# Patient Record
Sex: MALE | HISPANIC OR LATINO | URBAN - METROPOLITAN AREA
[De-identification: names, ages, dates, MRNs, and addresses within clinical notes are randomized per-mention and may not be internally consistent; named-entity substitution may affect disease eponyms.]

---

## 2018-09-20 ENCOUNTER — EMERGENCY (EMERGENCY)
Facility: HOSPITAL | Age: 33
LOS: 0 days | Discharge: HOME | End: 2018-09-21
Attending: EMERGENCY MEDICINE | Admitting: EMERGENCY MEDICINE
Payer: SELF-PAY

## 2018-09-20 VITALS
OXYGEN SATURATION: 98 % | RESPIRATION RATE: 15 BRPM | DIASTOLIC BLOOD PRESSURE: 98 MMHG | TEMPERATURE: 98 F | HEART RATE: 102 BPM | SYSTOLIC BLOOD PRESSURE: 147 MMHG | HEIGHT: 62 IN | WEIGHT: 134.92 LBS

## 2018-09-20 DIAGNOSIS — G40.89 OTHER SEIZURES: ICD-10-CM

## 2018-09-20 DIAGNOSIS — E87.6 HYPOKALEMIA: ICD-10-CM

## 2018-09-20 DIAGNOSIS — D64.9 ANEMIA, UNSPECIFIED: ICD-10-CM

## 2018-09-20 PROCEDURE — 99053 MED SERV 10PM-8AM 24 HR FAC: CPT

## 2018-09-20 PROCEDURE — 99285 EMERGENCY DEPT VISIT HI MDM: CPT | Mod: 25

## 2018-09-20 RX ORDER — LEVETIRACETAM 250 MG/1
500 TABLET, FILM COATED ORAL ONCE
Qty: 0 | Refills: 0 | Status: COMPLETED | OUTPATIENT
Start: 2018-09-20 | End: 2018-09-20

## 2018-09-20 NOTE — ED PROVIDER NOTE - PROGRESS NOTE DETAILS
d/w pt results of tests via .  Pt with no previous h/o anemia.  denies melana.  Brown stool on exam guiac neg.  Pt to f/u at [facility] for both evaluation of the anemia and for the seizures.  Will send keppra for now.

## 2018-09-20 NOTE — ED PROVIDER NOTE - OBJECTIVE STATEMENT
int [id number]  30 yo male biba s/p seizure.  Patient reports he was at work and had a seizure while seated in a chair, did not fall to the ground.  Patient states he felt normal prior to the seizure.  Had one prior seizure about 1 year ago, not started on medication, but seen by a doctor at that time.  Does not have a PMD.  Denies smoking, drinking or drug use.  Patient is able to provide a limited hx, but is post ictal.

## 2018-09-20 NOTE — ED ADULT NURSE NOTE - OBJECTIVE STATEMENT
Pt brought to the ED s/p seizure at work. Pt states that he was sitting in a chair when the seizure occurred. Pt states that he had a previous seizure approx 1 year ago, but denies ever being on medication.

## 2018-09-20 NOTE — ED PROVIDER NOTE - GASTROINTESTINAL, MLM
Abdomen soft, non-tender, no guarding. Abdomen soft, non-tender, no guarding.  Brown stool guiac neg control pos [id number] Exp 3/31/19

## 2018-09-21 VITALS
SYSTOLIC BLOOD PRESSURE: 136 MMHG | OXYGEN SATURATION: 99 % | HEART RATE: 100 BPM | DIASTOLIC BLOOD PRESSURE: 91 MMHG | RESPIRATION RATE: 16 BRPM

## 2018-09-21 LAB
ALBUMIN SERPL ELPH-MCNC: 3.8 G/DL — SIGNIFICANT CHANGE UP (ref 3.3–5)
ALP SERPL-CCNC: 113 U/L — SIGNIFICANT CHANGE UP (ref 40–120)
ALT FLD-CCNC: 51 U/L — SIGNIFICANT CHANGE UP (ref 12–78)
ANION GAP SERPL CALC-SCNC: 12 MMOL/L — SIGNIFICANT CHANGE UP (ref 5–17)
ANISOCYTOSIS BLD QL: SLIGHT — SIGNIFICANT CHANGE UP
AST SERPL-CCNC: 70 U/L — HIGH (ref 15–37)
BASOPHILS # BLD AUTO: 0 K/UL — SIGNIFICANT CHANGE UP (ref 0–0.2)
BASOPHILS NFR BLD AUTO: 0 % — SIGNIFICANT CHANGE UP (ref 0–2)
BILIRUB SERPL-MCNC: 0.8 MG/DL — SIGNIFICANT CHANGE UP (ref 0.2–1.2)
BUN SERPL-MCNC: 5 MG/DL — LOW (ref 7–23)
CALCIUM SERPL-MCNC: 8.7 MG/DL — SIGNIFICANT CHANGE UP (ref 8.5–10.1)
CHLORIDE SERPL-SCNC: 98 MMOL/L — SIGNIFICANT CHANGE UP (ref 96–108)
CO2 SERPL-SCNC: 27 MMOL/L — SIGNIFICANT CHANGE UP (ref 22–31)
CREAT SERPL-MCNC: 0.7 MG/DL — SIGNIFICANT CHANGE UP (ref 0.5–1.3)
DACRYOCYTES BLD QL SMEAR: SLIGHT — SIGNIFICANT CHANGE UP
EOSINOPHIL # BLD AUTO: 0 K/UL — SIGNIFICANT CHANGE UP (ref 0–0.5)
EOSINOPHIL NFR BLD AUTO: 0 % — SIGNIFICANT CHANGE UP (ref 0–6)
ETHANOL SERPL-MCNC: <10 MG/DL — SIGNIFICANT CHANGE UP (ref 0–10)
GLUCOSE SERPL-MCNC: 140 MG/DL — HIGH (ref 70–99)
HCT VFR BLD CALC: 29.1 % — LOW (ref 39–50)
HGB BLD-MCNC: 8.7 G/DL — LOW (ref 13–17)
HYPOCHROMIA BLD QL: SIGNIFICANT CHANGE UP
LYMPHOCYTES # BLD AUTO: 0.56 K/UL — LOW (ref 1–3.3)
LYMPHOCYTES # BLD AUTO: 5 % — LOW (ref 13–44)
MAGNESIUM SERPL-MCNC: 1.8 MG/DL — SIGNIFICANT CHANGE UP (ref 1.6–2.6)
MANUAL SMEAR VERIFICATION: SIGNIFICANT CHANGE UP
MCHC RBC-ENTMCNC: 21.8 PG — LOW (ref 27–34)
MCHC RBC-ENTMCNC: 29.9 GM/DL — LOW (ref 32–36)
MCV RBC AUTO: 72.9 FL — LOW (ref 80–100)
MICROCYTES BLD QL: SIGNIFICANT CHANGE UP
MONOCYTES # BLD AUTO: 0.67 K/UL — SIGNIFICANT CHANGE UP (ref 0–0.9)
MONOCYTES NFR BLD AUTO: 6 % — SIGNIFICANT CHANGE UP (ref 2–14)
NEUTROPHILS # BLD AUTO: 9.85 K/UL — HIGH (ref 1.8–7.4)
NEUTROPHILS NFR BLD AUTO: 88 % — HIGH (ref 43–77)
NRBC # BLD: 0 /100 — SIGNIFICANT CHANGE UP (ref 0–0)
NRBC # BLD: SIGNIFICANT CHANGE UP /100 WBCS (ref 0–0)
OVALOCYTES BLD QL SMEAR: SLIGHT — SIGNIFICANT CHANGE UP
PLAT MORPH BLD: NORMAL — SIGNIFICANT CHANGE UP
PLATELET # BLD AUTO: 244 K/UL — SIGNIFICANT CHANGE UP (ref 150–400)
POIKILOCYTOSIS BLD QL AUTO: SLIGHT — SIGNIFICANT CHANGE UP
POTASSIUM SERPL-MCNC: 3 MMOL/L — LOW (ref 3.5–5.3)
POTASSIUM SERPL-SCNC: 3 MMOL/L — LOW (ref 3.5–5.3)
PROMYELOCYTES # FLD: 1 % — HIGH (ref 0–0)
PROT SERPL-MCNC: 9.1 GM/DL — HIGH (ref 6–8.3)
RBC # BLD: 3.99 M/UL — LOW (ref 4.2–5.8)
RBC # FLD: 18.7 % — HIGH (ref 10.3–14.5)
RBC BLD AUTO: ABNORMAL
SODIUM SERPL-SCNC: 137 MMOL/L — SIGNIFICANT CHANGE UP (ref 135–145)
STOMATOCYTES BLD QL SMEAR: SIGNIFICANT CHANGE UP
TARGETS BLD QL SMEAR: SLIGHT — SIGNIFICANT CHANGE UP
WBC # BLD: 11.19 K/UL — HIGH (ref 3.8–10.5)
WBC # FLD AUTO: 11.19 K/UL — HIGH (ref 3.8–10.5)

## 2018-09-21 PROCEDURE — 70450 CT HEAD/BRAIN W/O DYE: CPT | Mod: 26

## 2018-09-21 RX ORDER — POTASSIUM CHLORIDE 20 MEQ
40 PACKET (EA) ORAL ONCE
Qty: 0 | Refills: 0 | Status: COMPLETED | OUTPATIENT
Start: 2018-09-21 | End: 2018-09-21

## 2018-09-21 RX ORDER — POTASSIUM CHLORIDE 20 MEQ
40 PACKET (EA) ORAL ONCE
Qty: 0 | Refills: 0 | Status: DISCONTINUED | OUTPATIENT
Start: 2018-09-21 | End: 2018-09-21

## 2018-09-21 RX ORDER — LEVETIRACETAM 250 MG/1
1 TABLET, FILM COATED ORAL
Qty: 60 | Refills: 0
Start: 2018-09-21

## 2018-09-21 RX ADMIN — Medication 40 MILLIEQUIVALENT(S): at 03:14

## 2018-09-21 RX ADMIN — LEVETIRACETAM 400 MILLIGRAM(S): 250 TABLET, FILM COATED ORAL at 00:10

## 2021-09-14 ENCOUNTER — INPATIENT (INPATIENT)
Facility: HOSPITAL | Age: 36
LOS: 5 days | Discharge: HOME | End: 2021-09-20
Attending: INTERNAL MEDICINE | Admitting: INTERNAL MEDICINE
Payer: MEDICAID

## 2021-09-14 VITALS
HEART RATE: 128 BPM | RESPIRATION RATE: 18 BRPM | HEIGHT: 62 IN | TEMPERATURE: 98 F | SYSTOLIC BLOOD PRESSURE: 115 MMHG | WEIGHT: 139.99 LBS | OXYGEN SATURATION: 98 % | DIASTOLIC BLOOD PRESSURE: 83 MMHG

## 2021-09-14 DIAGNOSIS — F10.239 ALCOHOL DEPENDENCE WITH WITHDRAWAL, UNSPECIFIED: ICD-10-CM

## 2021-09-14 PROBLEM — G40.909 EPILEPSY, UNSPECIFIED, NOT INTRACTABLE, WITHOUT STATUS EPILEPTICUS: Chronic | Status: ACTIVE | Noted: 2018-09-20

## 2021-09-14 LAB
ADD ON TEST-SPECIMEN IN LAB: SIGNIFICANT CHANGE UP
ADD ON TEST-SPECIMEN IN LAB: SIGNIFICANT CHANGE UP
ALBUMIN SERPL ELPH-MCNC: 4.6 G/DL — SIGNIFICANT CHANGE UP (ref 3.3–5)
ALP SERPL-CCNC: 139 U/L — HIGH (ref 40–120)
ALT FLD-CCNC: 84 U/L — HIGH (ref 12–78)
AMMONIA BLD-MCNC: 83 UMOL/L — HIGH (ref 11–32)
ANION GAP SERPL CALC-SCNC: 10 MMOL/L — SIGNIFICANT CHANGE UP (ref 5–17)
APAP SERPL-MCNC: < 2 UG/ML (ref 10–30)
APPEARANCE UR: CLEAR — SIGNIFICANT CHANGE UP
APTT BLD: 32.6 SEC — SIGNIFICANT CHANGE UP (ref 27.5–35.5)
AST SERPL-CCNC: 206 U/L — HIGH (ref 15–37)
BASOPHILS # BLD AUTO: 0.11 K/UL — SIGNIFICANT CHANGE UP (ref 0–0.2)
BASOPHILS NFR BLD AUTO: 0.8 % — SIGNIFICANT CHANGE UP (ref 0–2)
BILIRUB SERPL-MCNC: 2 MG/DL — HIGH (ref 0.2–1.2)
BILIRUB UR-MCNC: ABNORMAL
BUN SERPL-MCNC: 21 MG/DL — SIGNIFICANT CHANGE UP (ref 7–23)
CALCIUM SERPL-MCNC: 9.7 MG/DL — SIGNIFICANT CHANGE UP (ref 8.5–10.1)
CHLORIDE SERPL-SCNC: 100 MMOL/L — SIGNIFICANT CHANGE UP (ref 96–108)
CO2 SERPL-SCNC: 25 MMOL/L — SIGNIFICANT CHANGE UP (ref 22–31)
COLOR SPEC: ABNORMAL
CREAT SERPL-MCNC: 1.31 MG/DL — HIGH (ref 0.5–1.3)
DIFF PNL FLD: ABNORMAL
EOSINOPHIL # BLD AUTO: 0 K/UL — SIGNIFICANT CHANGE UP (ref 0–0.5)
EOSINOPHIL NFR BLD AUTO: 0 % — SIGNIFICANT CHANGE UP (ref 0–6)
ETHANOL SERPL-MCNC: <10 MG/DL — SIGNIFICANT CHANGE UP (ref 0–10)
GLUCOSE SERPL-MCNC: 135 MG/DL — HIGH (ref 70–99)
GLUCOSE UR QL: NEGATIVE MG/DL — SIGNIFICANT CHANGE UP
HCT VFR BLD CALC: 37.6 % — LOW (ref 39–50)
HGB BLD-MCNC: 11.7 G/DL — LOW (ref 13–17)
IMM GRANULOCYTES NFR BLD AUTO: 0.6 % — SIGNIFICANT CHANGE UP (ref 0–1.5)
INR BLD: 1.2 RATIO — HIGH (ref 0.88–1.16)
KETONES UR-MCNC: ABNORMAL
LACTATE SERPL-SCNC: 0.8 MMOL/L — SIGNIFICANT CHANGE UP (ref 0.7–2)
LEUKOCYTE ESTERASE UR-ACNC: ABNORMAL
LYMPHOCYTES # BLD AUTO: 0.71 K/UL — LOW (ref 1–3.3)
LYMPHOCYTES # BLD AUTO: 5 % — LOW (ref 13–44)
MCHC RBC-ENTMCNC: 24 PG — LOW (ref 27–34)
MCHC RBC-ENTMCNC: 31.1 GM/DL — LOW (ref 32–36)
MCV RBC AUTO: 77.2 FL — LOW (ref 80–100)
MONOCYTES # BLD AUTO: 0.94 K/UL — HIGH (ref 0–0.9)
MONOCYTES NFR BLD AUTO: 6.6 % — SIGNIFICANT CHANGE UP (ref 2–14)
NEUTROPHILS # BLD AUTO: 12.44 K/UL — HIGH (ref 1.8–7.4)
NEUTROPHILS NFR BLD AUTO: 87 % — HIGH (ref 43–77)
NITRITE UR-MCNC: NEGATIVE — SIGNIFICANT CHANGE UP
PCP SPEC-MCNC: SIGNIFICANT CHANGE UP
PH UR: 6 — SIGNIFICANT CHANGE UP (ref 5–8)
PLATELET # BLD AUTO: 188 K/UL — SIGNIFICANT CHANGE UP (ref 150–400)
POTASSIUM SERPL-MCNC: 2.7 MMOL/L — CRITICAL LOW (ref 3.5–5.3)
POTASSIUM SERPL-SCNC: 2.7 MMOL/L — CRITICAL LOW (ref 3.5–5.3)
PROT SERPL-MCNC: 9.7 GM/DL — HIGH (ref 6–8.3)
PROT UR-MCNC: 100 MG/DL
PROTHROM AB SERPL-ACNC: 13.9 SEC — HIGH (ref 10.6–13.6)
RBC # BLD: 4.87 M/UL — SIGNIFICANT CHANGE UP (ref 4.2–5.8)
RBC # FLD: 18.1 % — HIGH (ref 10.3–14.5)
SALICYLATES SERPL-MCNC: <1.7 MG/DL (ref 2.8–20)
SARS-COV-2 RNA SPEC QL NAA+PROBE: SIGNIFICANT CHANGE UP
SODIUM SERPL-SCNC: 135 MMOL/L — SIGNIFICANT CHANGE UP (ref 135–145)
SP GR SPEC: 1.02 — SIGNIFICANT CHANGE UP (ref 1.01–1.02)
TROPONIN I SERPL-MCNC: <0.015 NG/ML — SIGNIFICANT CHANGE UP (ref 0.01–0.04)
TROPONIN I SERPL-MCNC: <0.015 NG/ML — SIGNIFICANT CHANGE UP (ref 0.01–0.04)
TSH SERPL-MCNC: 5.16 UU/ML — HIGH (ref 0.34–4.82)
UROBILINOGEN FLD QL: 4 MG/DL
WBC # BLD: 14.28 K/UL — HIGH (ref 3.8–10.5)
WBC # FLD AUTO: 14.28 K/UL — HIGH (ref 3.8–10.5)

## 2021-09-14 PROCEDURE — 84100 ASSAY OF PHOSPHORUS: CPT

## 2021-09-14 PROCEDURE — 97162 PT EVAL MOD COMPLEX 30 MIN: CPT | Mod: GP

## 2021-09-14 PROCEDURE — 97161 PT EVAL LOW COMPLEX 20 MIN: CPT | Mod: GP

## 2021-09-14 PROCEDURE — 99254 IP/OBS CNSLTJ NEW/EST MOD 60: CPT

## 2021-09-14 PROCEDURE — 76700 US EXAM ABDOM COMPLETE: CPT

## 2021-09-14 PROCEDURE — 81001 URINALYSIS AUTO W/SCOPE: CPT

## 2021-09-14 PROCEDURE — 80048 BASIC METABOLIC PNL TOTAL CA: CPT

## 2021-09-14 PROCEDURE — 80053 COMPREHEN METABOLIC PANEL: CPT

## 2021-09-14 PROCEDURE — G1004: CPT

## 2021-09-14 PROCEDURE — C9113: CPT

## 2021-09-14 PROCEDURE — 70450 CT HEAD/BRAIN W/O DYE: CPT | Mod: 26,MG

## 2021-09-14 PROCEDURE — 97116 GAIT TRAINING THERAPY: CPT | Mod: GP

## 2021-09-14 PROCEDURE — 93005 ELECTROCARDIOGRAM TRACING: CPT

## 2021-09-14 PROCEDURE — 85025 COMPLETE CBC W/AUTO DIFF WBC: CPT

## 2021-09-14 PROCEDURE — 97530 THERAPEUTIC ACTIVITIES: CPT | Mod: GP

## 2021-09-14 PROCEDURE — 36415 COLL VENOUS BLD VENIPUNCTURE: CPT

## 2021-09-14 PROCEDURE — 85730 THROMBOPLASTIN TIME PARTIAL: CPT

## 2021-09-14 PROCEDURE — 99222 1ST HOSP IP/OBS MODERATE 55: CPT

## 2021-09-14 PROCEDURE — 80307 DRUG TEST PRSMV CHEM ANLYZR: CPT

## 2021-09-14 PROCEDURE — 84443 ASSAY THYROID STIM HORMONE: CPT

## 2021-09-14 PROCEDURE — 85610 PROTHROMBIN TIME: CPT

## 2021-09-14 PROCEDURE — U0003: CPT

## 2021-09-14 PROCEDURE — 84484 ASSAY OF TROPONIN QUANT: CPT

## 2021-09-14 PROCEDURE — U0005: CPT

## 2021-09-14 PROCEDURE — 82140 ASSAY OF AMMONIA: CPT

## 2021-09-14 PROCEDURE — 99285 EMERGENCY DEPT VISIT HI MDM: CPT

## 2021-09-14 PROCEDURE — 86769 SARS-COV-2 COVID-19 ANTIBODY: CPT

## 2021-09-14 PROCEDURE — 83735 ASSAY OF MAGNESIUM: CPT

## 2021-09-14 PROCEDURE — 84439 ASSAY OF FREE THYROXINE: CPT

## 2021-09-14 PROCEDURE — 85027 COMPLETE CBC AUTOMATED: CPT

## 2021-09-14 PROCEDURE — 93010 ELECTROCARDIOGRAM REPORT: CPT | Mod: 76

## 2021-09-14 PROCEDURE — 71045 X-RAY EXAM CHEST 1 VIEW: CPT | Mod: 26

## 2021-09-14 PROCEDURE — 84481 FREE ASSAY (FT-3): CPT

## 2021-09-14 RX ORDER — SODIUM CHLORIDE 9 MG/ML
1000 INJECTION, SOLUTION INTRAVENOUS
Refills: 0 | Status: DISCONTINUED | OUTPATIENT
Start: 2021-09-14 | End: 2021-09-17

## 2021-09-14 RX ORDER — PIPERACILLIN AND TAZOBACTAM 4; .5 G/20ML; G/20ML
3.38 INJECTION, POWDER, LYOPHILIZED, FOR SOLUTION INTRAVENOUS ONCE
Refills: 0 | Status: COMPLETED | OUTPATIENT
Start: 2021-09-14 | End: 2021-09-14

## 2021-09-14 RX ORDER — FOLIC ACID 0.8 MG
1 TABLET ORAL DAILY
Refills: 0 | Status: DISCONTINUED | OUTPATIENT
Start: 2021-09-14 | End: 2021-09-20

## 2021-09-14 RX ORDER — SODIUM CHLORIDE 9 MG/ML
1000 INJECTION INTRAMUSCULAR; INTRAVENOUS; SUBCUTANEOUS ONCE
Refills: 0 | Status: COMPLETED | OUTPATIENT
Start: 2021-09-14 | End: 2021-09-14

## 2021-09-14 RX ORDER — ENOXAPARIN SODIUM 100 MG/ML
40 INJECTION SUBCUTANEOUS DAILY
Refills: 0 | Status: DISCONTINUED | OUTPATIENT
Start: 2021-09-14 | End: 2021-09-20

## 2021-09-14 RX ORDER — LACTULOSE 10 G/15ML
10 SOLUTION ORAL THREE TIMES A DAY
Refills: 0 | Status: DISCONTINUED | OUTPATIENT
Start: 2021-09-14 | End: 2021-09-17

## 2021-09-14 RX ORDER — THIAMINE MONONITRATE (VIT B1) 100 MG
100 TABLET ORAL ONCE
Refills: 0 | Status: COMPLETED | OUTPATIENT
Start: 2021-09-14 | End: 2021-09-14

## 2021-09-14 RX ORDER — POTASSIUM CHLORIDE 20 MEQ
10 PACKET (EA) ORAL
Refills: 0 | Status: COMPLETED | OUTPATIENT
Start: 2021-09-14 | End: 2021-09-14

## 2021-09-14 RX ORDER — POTASSIUM CHLORIDE 20 MEQ
40 PACKET (EA) ORAL ONCE
Refills: 0 | Status: COMPLETED | OUTPATIENT
Start: 2021-09-14 | End: 2021-09-14

## 2021-09-14 RX ORDER — THIAMINE MONONITRATE (VIT B1) 100 MG
100 TABLET ORAL DAILY
Refills: 0 | Status: COMPLETED | OUTPATIENT
Start: 2021-09-14 | End: 2021-09-17

## 2021-09-14 RX ORDER — PANTOPRAZOLE SODIUM 20 MG/1
40 TABLET, DELAYED RELEASE ORAL DAILY
Refills: 0 | Status: DISCONTINUED | OUTPATIENT
Start: 2021-09-14 | End: 2021-09-18

## 2021-09-14 RX ORDER — POTASSIUM PHOSPHATE, MONOBASIC POTASSIUM PHOSPHATE, DIBASIC 236; 224 MG/ML; MG/ML
15 INJECTION, SOLUTION INTRAVENOUS ONCE
Refills: 0 | Status: COMPLETED | OUTPATIENT
Start: 2021-09-14 | End: 2021-09-14

## 2021-09-14 RX ADMIN — Medication 2 MILLIGRAM(S): at 14:27

## 2021-09-14 RX ADMIN — Medication 100 MILLIEQUIVALENT(S): at 15:29

## 2021-09-14 RX ADMIN — Medication 2 MILLIGRAM(S): at 17:18

## 2021-09-14 RX ADMIN — Medication 2 MILLIGRAM(S): at 18:31

## 2021-09-14 RX ADMIN — PANTOPRAZOLE SODIUM 40 MILLIGRAM(S): 20 TABLET, DELAYED RELEASE ORAL at 18:30

## 2021-09-14 RX ADMIN — LACTULOSE 10 GRAM(S): 10 SOLUTION ORAL at 17:00

## 2021-09-14 RX ADMIN — Medication 100 MILLIGRAM(S): at 17:01

## 2021-09-14 RX ADMIN — SODIUM CHLORIDE 1000 MILLILITER(S): 9 INJECTION INTRAMUSCULAR; INTRAVENOUS; SUBCUTANEOUS at 14:27

## 2021-09-14 RX ADMIN — POTASSIUM PHOSPHATE, MONOBASIC POTASSIUM PHOSPHATE, DIBASIC 62.5 MILLIMOLE(S): 236; 224 INJECTION, SOLUTION INTRAVENOUS at 18:15

## 2021-09-14 RX ADMIN — PIPERACILLIN AND TAZOBACTAM 200 GRAM(S): 4; .5 INJECTION, POWDER, LYOPHILIZED, FOR SOLUTION INTRAVENOUS at 15:40

## 2021-09-14 RX ADMIN — SODIUM CHLORIDE 1000 MILLILITER(S): 9 INJECTION INTRAMUSCULAR; INTRAVENOUS; SUBCUTANEOUS at 12:32

## 2021-09-14 RX ADMIN — ENOXAPARIN SODIUM 40 MILLIGRAM(S): 100 INJECTION SUBCUTANEOUS at 19:08

## 2021-09-14 RX ADMIN — Medication 2 MILLIGRAM(S): at 16:36

## 2021-09-14 RX ADMIN — Medication 40 MILLIEQUIVALENT(S): at 14:35

## 2021-09-14 RX ADMIN — Medication 100 MILLIEQUIVALENT(S): at 14:28

## 2021-09-14 RX ADMIN — Medication 1 MILLIGRAM(S): at 12:33

## 2021-09-14 RX ADMIN — Medication 100 MILLIEQUIVALENT(S): at 16:40

## 2021-09-14 RX ADMIN — SODIUM CHLORIDE 83 MILLILITER(S): 9 INJECTION, SOLUTION INTRAVENOUS at 19:20

## 2021-09-14 RX ADMIN — Medication 2 MILLIGRAM(S): at 19:09

## 2021-09-14 RX ADMIN — Medication 2 MILLIGRAM(S): at 21:10

## 2021-09-14 RX ADMIN — Medication 2 MILLIGRAM(S): at 17:33

## 2021-09-14 NOTE — H&P ADULT - ASSESSMENT
Tried obtaining history using  but the  was not able to understand  the pt much as he is AMS. HX obtained from chart. 35/M with PMHx of seizure disorder brought to the ED BIBEMS. EMS was called by police when pt was found on the side of the road "digging in dirt." Pt with known Hx of ETOH abuse, admits to drinking 3-5 bottles a day. It could not be ascertained as to bottles of what, whether beer or hard liquor. Pt is confused at this time and trying to remove tele monitor.     CT head neg.    Pt admitted with multitude of problems    1) Alcohol abuse + Alcohol Withdrawal + Hepatic encephalopathy, NH3 83 + Alcoholic Hepatitis:  admit  CT head neg for acute event  npo except meds  iv fluids  fall/aspiration/seizure precautions  po lactulose  po Xifaxan  in Protonix  high dose CIWA protocol with Ativan iv  add thiamine/mvi/folic acid  social work consult  lipase normal  recheck Ammonia/cmp in am  check PT/INR/PTT    2) ADRIANE: cr 1.31; baseline 0.70  iv fluids  labs in am  if not improving then renal imaging and consult    3) Hypokalemia 2.7:   replaced in ED  recheck    4) Leukocytosis: wbc 14 k; also elevated in the past  likely stress reaction  no uti or PNA or any other evident infection  cbc in am    5) Hypophosphatemia 2.4:  replace; recheck    6) Mildly elevated TSH 5.16: recheck in 2 weeks after current insult is over    7) DVT PPX: lovenox    poc discussed with team.    Tried obtaining history using  but the  was not able to understand  the pt much as he is AMS. HX obtained from chart. 35/M with PMHx of seizure disorder brought to the ED BIBEMS. EMS was called by police when pt was found on the side of the road "digging in dirt." Pt with known Hx of ETOH abuse, admits to drinking 3-5 bottles a day. It could not be ascertained as to bottles of what, whether beer or hard liquor. Pt is confused at this time and trying to remove tele monitor.     CT head neg.    Pt admitted with multitude of problems    1) Alcohol abuse + Alcohol Withdrawal + Hepatic encephalopathy, NH3 83 + Alcoholic Hepatitis:  admit  CT head neg for acute event  npo except meds  iv fluids  fall/aspiration/seizure precautions  po lactulose  po Xifaxan  in Protonix  high dose CIWA protocol with Ativan iv  add thiamine/mvi/folic acid  social work consult  lipase normal  recheck Ammonia/cmp in am  check PT/INR/PTT  US abdo to r/o cirrhosis    2) ADRIANE: cr 1.31; baseline 0.70  iv fluids  labs in am  if not improving then renal imaging and consult    3) Hypokalemia 2.7:   replaced in ED  recheck    4) Leukocytosis: wbc 14 k; also elevated in the past  likely stress reaction  no uti or PNA or any other evident infection  cbc in am    5) Hypophosphatemia 2.4:  replace; recheck    6) Mildly elevated TSH 5.16: recheck in 2 weeks after current insult is over    7) DVT PPX: lovenox    poc discussed with team.

## 2021-09-14 NOTE — ED ADULT NURSE NOTE - CHIEF COMPLAINT QUOTE
pt presents to ed via ems for medical evaluation. ems was called by police when pt was found on the side of the road "digging in dirt", in ed, pt appears anxious, afraid , and tremulous. endorsing to Maltese speaking nursing assistant that his friends are trying to hurt him. pt placed near nursing station for monitoring

## 2021-09-14 NOTE — H&P ADULT - NSHPPHYSICALEXAM_GEN_ALL_CORE
PHYSICAL EXAM:    Daily Height in cm: 157.48 (14 Sep 2021 12:11)    Daily     Vital Signs Last 24 Hrs  T(C): 36.9 (14 Sep 2021 12:11), Max: 36.9 (14 Sep 2021 12:11)  T(F): 98.4 (14 Sep 2021 12:11), Max: 98.4 (14 Sep 2021 12:11)  HR: 105 (14 Sep 2021 14:55) (105 - 128)  BP: 135/89 (14 Sep 2021 14:55) (115/83 - 135/89)  BP(mean): 100 (14 Sep 2021 14:55) (100 - 100)  ABP: --  ABP(mean): --  RR: 20 (14 Sep 2021 14:40) (18 - 20)  SpO2: 100% (14 Sep 2021 14:55) (97% - 100%)      Constitutional: confused and AMS appearing; trying to rip off tele monitor   HEENT: Atraumatic, KOLTON  Respiratory: Breath Sounds normal, no rhonchi/wheeze  Cardiovascular: N S1S2;   Gastrointestinal: Abdomen soft, non tender, Bowel Sounds present  Extremities: No edema, peripheral pulses present  Neurological: Awake; confused. not oriented, does not follow any commands  Skin: Non cellulitic, no rash, ulcers  Lymph Nodes: No lymphadenopathy noted  Back: No CVA tenderness   Musculoskeletal: non tender  Breasts: Deferred  Genitourinary: deferred  Rectal: Deferred

## 2021-09-14 NOTE — ED PROVIDER NOTE - CLINICAL SUMMARY MEDICAL DECISION MAKING FREE TEXT BOX
34 y/o male with AMS, Hx of EOTH abuse. EKG, CXR, CT head, reevaluation. 36 y/o male with AMS, Hx of ETOH abuse. EKG, CXR, CT head, reevaluation.

## 2021-09-14 NOTE — PROGRESS NOTE ADULT - ASSESSMENT
Patient with alcohol withdrawal  hepatic encephalopathy    given current bed situation would treat with 1:1, and prn ativan, and pulse oximetry  rifaxin for for hepatic encephalopathty  will follow prn

## 2021-09-14 NOTE — ED PROVIDER NOTE - OBJECTIVE STATEMENT
34 y/o male with PMHx of seizure disorder presents to the ED BIBEMS. EMS was called by police when pt was found on the side of the road "digging in dirt." Pt with known Hx of EOTH abuse, admits to drinking 2 drinks a day.    used, id# [id number] 34 y/o male with PMHx of seizure disorder presents to the ED BIBEMS. EMS was called by police when pt was found on the side of the road "digging in dirt." Pt with known Hx of EOTH abuse, admits to drinking 2 drinks a day every day.    used, id# [id number] 34 y/o male with PMHx of seizure disorder presents to the ED BIBEMS. EMS was called by police when pt was found on the side of the road "digging in dirt." Pt with known Hx of ETOH abuse, admits to drinking 2 drinks a day every day.    used, id# [id number]

## 2021-09-14 NOTE — SBIRT NOTE ADULT - NSSBIRTBRIEFINTDET_GEN_A_CORE
Tc w/ Reviewed with pt + screen results and provided pt with information about healthy guidelines  & potential negative consequences associated with level of risk. Pt verbalizes understanding & is agrees with ref to [facility] in [place]

## 2021-09-14 NOTE — H&P ADULT - HISTORY OF PRESENT ILLNESS
Tried obtaining history using  but the  was not able to understand  the pt much as he is AMS. HX obtained from chart. 35/M with PMHx of seizure disorder brought to the ED BIBEMS. EMS was called by police when pt was found on the side of the road "digging in dirt." Pt with known Hx of ETOH abuse, admits to drinking 3-5 bottles a day. It could not be ascertained as to bottles of what, whether beer or hard liquor. Pt is confused at this time and trying to remove tele monitor.     CT head neg.

## 2021-09-14 NOTE — ED ADULT NURSE NOTE - OBJECTIVE STATEMENT
Pt presents to er after being found digging in the dirt for bugs, bystander called police to assist pt to go for assessment, pt with history of alcoholism and states he drank 2 times today, pt disoriented, states name and place at this time, pt disoriented to situation, time, denies pain, abd non-tender to palpation, radial pulse +2 tachycardic/regular, color appropriate for ethnicity, pt presents unkempt without any shoes, calm, placed in front of nurses station in hallway, neg tremors/, fasciculation's of tongue, speech clear, safety maintained with stretcher in lowest position, will continue to monitor.

## 2021-09-14 NOTE — ED ADULT TRIAGE NOTE - CHIEF COMPLAINT QUOTE
pt presents to ed via ems for medical evaluation. ems was called by police when pt was found on the side of the road "digging in dirt", in ed, pt appears anxious, afraid , and tremulous. endorsing to Hong Konger speaking nursing assistant that his friends are trying to hurt him. pt placed near nursing station for monitoring

## 2021-09-14 NOTE — ED PROVIDER NOTE - PROGRESS NOTE DETAILS
Erick DO: patient with hypokalemia; will replete; alcohol level negative at this time; ammonia level added on, patient with 4 different accounts; hx of ICU admissions in past for alcohol withdrawal; ?AMS in setting of recent seizure; endorsed to Dr. Henderson for admission.

## 2021-09-14 NOTE — SBIRT NOTE ADULT - NSSBIRTSAVECONSULT_GEN_A_CORE
Health , with the assistance of ED SW, approached registration to encourage their department to merge this patient's records as the patient has been registered under several different MRNs; registration was able to varify the patient's date of birth via a copy of his passport./[facility] , with the assistance of ED SW, approached registration to encourage their department to merge this patient's records as the patient has been registered under several different MRNs; registration was able to varify the patient's date of birth via a copy of his passport./Complete

## 2021-09-14 NOTE — PHARMACOTHERAPY INTERVENTION NOTE - COMMENTS
patient currently confused, unable to provide medication history, no emergency contact listed, doctor first med profile has no pharmacy records for this patient, patient was prescibed keppra 500 mg BID in 2018 from [facility] patient currently confused, unable to provide medication history, no emergency contact listed, doctor first med profile has no pharmacy records for this patient, patient was prescribed keppra 500 mg BID in 2018 from [facility], called patients [facility] pharmacy on file and no medications filled since 2018

## 2021-09-14 NOTE — ED ADULT NURSE REASSESSMENT NOTE - NS ED NURSE REASSESS COMMENT FT1
Pt's CIWA-22,  notified, pt agitated and not able to redirect pt, states ICU consult will be placed, verbal order for additional 2mg Atival ordered at this time, ordered, will administered when verified, will continue to monitor.

## 2021-09-14 NOTE — H&P ADULT - NSHPLABSRESULTS_GEN_ALL_CORE
Lab Results:  CBC  CBC Full  -  ( 14 Sep 2021 12:29 )  WBC Count : 14.28 K/uL  RBC Count : 4.87 M/uL  Hemoglobin : 11.7 g/dL  Hematocrit : 37.6 %  Platelet Count - Automated : 188 K/uL  Mean Cell Volume : 77.2 fl  Mean Cell Hemoglobin : 24.0 pg  Mean Cell Hemoglobin Concentration : 31.1 gm/dL  Auto Neutrophil # : 12.44 K/uL  Auto Lymphocyte # : 0.71 K/uL  Auto Monocyte # : 0.94 K/uL  Auto Eosinophil # : 0.00 K/uL  Auto Basophil # : 0.11 K/uL  Auto Neutrophil % : 87.0 %  Auto Lymphocyte % : 5.0 %  Auto Monocyte % : 6.6 %  Auto Eosinophil % : 0.0 %  Auto Basophil % : 0.8 %    .		Differential:	[] Automated		[] Manual  Chemistry                        11.7    )-----------( 188      ( 14 Sep 2021 12:29 )             37.6     09-14    135  |  100  |  21  ----------------------------<  135<H>  2.7<LL>   |  25  |  1.31<H>    Ca    9.7      14 Sep 2021 12:29  Mg     2.2     -    TPro  9.7<H>  /  Alb  4.6  /  TBili  2.0<H>  /  DBili  x   /  AST  206<H>  /  ALT  84<H>  /  AlkPhos  139<H>      LIVER FUNCTIONS - ( 14 Sep 2021 12:29 )  Alb: 4.6 g/dL / Pro: 9.7 gm/dL / ALK PHOS: 139 U/L / ALT: 84 U/L / AST: 206 U/L / GGT: x             Urinalysis Basic - ( 14 Sep 2021 15:57 )    Color: Pastora / Appearance: Clear / S.020 / pH: x  Gluc: x / Ketone: Small  / Bili: Small / Urobili: 4 mg/dL   Blood: x / Protein: 100 mg/dL / Nitrite: Negative   Leuk Esterase: Trace / RBC: 3-5 /HPF / WBC 3-5   Sq Epi: x / Non Sq Epi: Occasional / Bacteria: Occasional        RADIOLOGY RESULTS:    < from: Xray Chest 1 View- PORTABLE-Urgent (Xray Chest 1 View- PORTABLE-Urgent .) (21 @ 13:08) >    IMPRESSION: Clear lungs.    < end of copied text >    < from: CT Head No Cont (21 @ 13:07) >    IMPRESSION:    Atrophy for the patient's stated age, clinical correlation is recommended.    No hydrocephalus, acute intracranial hemorrhage, mass effect, or brain edema.    < end of copied text >    MEDICATIONS  (STANDING):  dextrose 5% + sodium chloride 0.9%. 1000 milliLiter(s) (83 mL/Hr) IV Continuous <Continuous>  enoxaparin Injectable 40 milliGRAM(s) SubCutaneous daily  folic acid 1 milliGRAM(s) Oral daily  lactulose Syrup 10 Gram(s) Oral three times a day  LORazepam   Injectable 2 milliGRAM(s) IV Push every 4 hours  LORazepam   Injectable   IV Push   multivitamin 1 Tablet(s) Oral daily  pantoprazole  Injectable 40 milliGRAM(s) IV Push daily  potassium chloride  10 mEq/100 mL IVPB 10 milliEquivalent(s) IV Intermittent every 1 hour  rifAXIMin 550 milliGRAM(s) Oral two times a day  thiamine 100 milliGRAM(s) Oral daily  thiamine Injectable 100 milliGRAM(s) IntraMuscular once    MEDICATIONS  (PRN):  LORazepam   Injectable 2 milliGRAM(s) IV Push every 2 hours PRN CIWA-Ar score increase by 2 points and a total score of 7 or less  LORazepam   Injectable 2 milliGRAM(s) IV Push every 1 hour PRN Symptom-triggered: each CIWA -Ar score 8 or GREATER

## 2021-09-15 LAB
ALBUMIN SERPL ELPH-MCNC: 3.3 G/DL — SIGNIFICANT CHANGE UP (ref 3.3–5)
ALP SERPL-CCNC: 84 U/L — SIGNIFICANT CHANGE UP (ref 40–120)
ALT FLD-CCNC: 58 U/L — SIGNIFICANT CHANGE UP (ref 12–78)
AMMONIA BLD-MCNC: 80 UMOL/L — HIGH (ref 11–32)
ANION GAP SERPL CALC-SCNC: 6 MMOL/L — SIGNIFICANT CHANGE UP (ref 5–17)
AST SERPL-CCNC: 109 U/L — HIGH (ref 15–37)
BILIRUB SERPL-MCNC: 1.2 MG/DL — SIGNIFICANT CHANGE UP (ref 0.2–1.2)
BUN SERPL-MCNC: 7 MG/DL — SIGNIFICANT CHANGE UP (ref 7–23)
CALCIUM SERPL-MCNC: 8.1 MG/DL — LOW (ref 8.5–10.1)
CHLORIDE SERPL-SCNC: 111 MMOL/L — HIGH (ref 96–108)
CO2 SERPL-SCNC: 24 MMOL/L — SIGNIFICANT CHANGE UP (ref 22–31)
COVID-19 SPIKE DOMAIN AB INTERP: POSITIVE
COVID-19 SPIKE DOMAIN ANTIBODY RESULT: 4.53 U/ML — HIGH
CREAT SERPL-MCNC: 0.46 MG/DL — LOW (ref 0.5–1.3)
GLUCOSE SERPL-MCNC: 99 MG/DL — SIGNIFICANT CHANGE UP (ref 70–99)
HCT VFR BLD CALC: 30.9 % — LOW (ref 39–50)
HGB BLD-MCNC: 9.5 G/DL — LOW (ref 13–17)
MAGNESIUM SERPL-MCNC: 2 MG/DL — SIGNIFICANT CHANGE UP (ref 1.6–2.6)
MCHC RBC-ENTMCNC: 24.2 PG — LOW (ref 27–34)
MCHC RBC-ENTMCNC: 30.7 GM/DL — LOW (ref 32–36)
MCV RBC AUTO: 78.8 FL — LOW (ref 80–100)
PHOSPHATE SERPL-MCNC: 2.3 MG/DL — LOW (ref 2.5–4.5)
PLATELET # BLD AUTO: 170 K/UL — SIGNIFICANT CHANGE UP (ref 150–400)
POTASSIUM SERPL-MCNC: 3 MMOL/L — LOW (ref 3.5–5.3)
POTASSIUM SERPL-SCNC: 3 MMOL/L — LOW (ref 3.5–5.3)
PROT SERPL-MCNC: 7.1 GM/DL — SIGNIFICANT CHANGE UP (ref 6–8.3)
RBC # BLD: 3.92 M/UL — LOW (ref 4.2–5.8)
RBC # FLD: 18.5 % — HIGH (ref 10.3–14.5)
SARS-COV-2 IGG+IGM SERPL QL IA: 4.53 U/ML — HIGH
SARS-COV-2 IGG+IGM SERPL QL IA: POSITIVE
SODIUM SERPL-SCNC: 141 MMOL/L — SIGNIFICANT CHANGE UP (ref 135–145)
WBC # BLD: 9.05 K/UL — SIGNIFICANT CHANGE UP (ref 3.8–10.5)
WBC # FLD AUTO: 9.05 K/UL — SIGNIFICANT CHANGE UP (ref 3.8–10.5)

## 2021-09-15 PROCEDURE — 99233 SBSQ HOSP IP/OBS HIGH 50: CPT

## 2021-09-15 PROCEDURE — 76700 US EXAM ABDOM COMPLETE: CPT | Mod: 26

## 2021-09-15 RX ORDER — POTASSIUM PHOSPHATE, MONOBASIC POTASSIUM PHOSPHATE, DIBASIC 236; 224 MG/ML; MG/ML
30 INJECTION, SOLUTION INTRAVENOUS ONCE
Refills: 0 | Status: COMPLETED | OUTPATIENT
Start: 2021-09-15 | End: 2021-09-15

## 2021-09-15 RX ADMIN — Medication 100 MILLIGRAM(S): at 09:49

## 2021-09-15 RX ADMIN — Medication 1 TABLET(S): at 09:49

## 2021-09-15 RX ADMIN — LACTULOSE 10 GRAM(S): 10 SOLUTION ORAL at 14:05

## 2021-09-15 RX ADMIN — Medication 2 MILLIGRAM(S): at 09:49

## 2021-09-15 RX ADMIN — Medication 2 MILLIGRAM(S): at 06:54

## 2021-09-15 RX ADMIN — Medication 2 MILLIGRAM(S): at 00:58

## 2021-09-15 RX ADMIN — Medication 1.5 MILLIGRAM(S): at 18:07

## 2021-09-15 RX ADMIN — Medication 1 MILLIGRAM(S): at 09:49

## 2021-09-15 RX ADMIN — LACTULOSE 10 GRAM(S): 10 SOLUTION ORAL at 22:05

## 2021-09-15 RX ADMIN — POTASSIUM PHOSPHATE, MONOBASIC POTASSIUM PHOSPHATE, DIBASIC 83.33 MILLIMOLE(S): 236; 224 INJECTION, SOLUTION INTRAVENOUS at 11:49

## 2021-09-15 RX ADMIN — PANTOPRAZOLE SODIUM 40 MILLIGRAM(S): 20 TABLET, DELAYED RELEASE ORAL at 09:49

## 2021-09-15 RX ADMIN — Medication 1.5 MILLIGRAM(S): at 14:04

## 2021-09-15 RX ADMIN — Medication 2 MILLIGRAM(S): at 06:06

## 2021-09-15 RX ADMIN — Medication 1.5 MILLIGRAM(S): at 22:05

## 2021-09-15 RX ADMIN — Medication 2 MILLIGRAM(S): at 02:59

## 2021-09-15 RX ADMIN — ENOXAPARIN SODIUM 40 MILLIGRAM(S): 100 INJECTION SUBCUTANEOUS at 09:49

## 2021-09-15 NOTE — PHYSICAL THERAPY INITIAL EVALUATION ADULT - PERTINENT HX OF CURRENT PROBLEM, REHAB EVAL
seizure disorder brought to the ED BIBEMS. EMS was called by police when pt was found on the side of the road "digging in dirt." Pt with known Hx of ETOH abuse, admits to drinking 3-5 "bottles" a day

## 2021-09-15 NOTE — PROGRESS NOTE ADULT - ASSESSMENT
35 y.o. male with seizure Dx in ETOH WD    1. AMS - metabolic/hepatic encephalopathy due to ETOH withdrawal + ETOH liver Dx   - ativan taper, lactulose, xifaxan   - thiamine for presumed thiamine deficiency    2. Hypokalemia - repleted    3. Elevated creatinine - no ADRIANE, resolved    4. Abn TSH - outpt follow up    5. VTE proph - LMWH    6. GI proph - PPI

## 2021-09-15 NOTE — PHYSICAL THERAPY INITIAL EVALUATION ADULT - GAIT DEVIATIONS NOTED, PT EVAL
mildly unsteady, inc assist w/ direction changes mildly unsteady, inc assist w/ direction changes, HR to 130s, returned to bed and HR to 100s

## 2021-09-16 LAB
ANION GAP SERPL CALC-SCNC: 8 MMOL/L — SIGNIFICANT CHANGE UP (ref 5–17)
BUN SERPL-MCNC: 1 MG/DL — LOW (ref 7–23)
CALCIUM SERPL-MCNC: 8.6 MG/DL — SIGNIFICANT CHANGE UP (ref 8.5–10.1)
CHLORIDE SERPL-SCNC: 105 MMOL/L — SIGNIFICANT CHANGE UP (ref 96–108)
CO2 SERPL-SCNC: 26 MMOL/L — SIGNIFICANT CHANGE UP (ref 22–31)
CREAT SERPL-MCNC: 0.37 MG/DL — LOW (ref 0.5–1.3)
GLUCOSE SERPL-MCNC: 99 MG/DL — SIGNIFICANT CHANGE UP (ref 70–99)
HCT VFR BLD CALC: 30 % — LOW (ref 39–50)
HGB BLD-MCNC: 9.2 G/DL — LOW (ref 13–17)
MAGNESIUM SERPL-MCNC: 1.8 MG/DL — SIGNIFICANT CHANGE UP (ref 1.6–2.6)
MCHC RBC-ENTMCNC: 24 PG — LOW (ref 27–34)
MCHC RBC-ENTMCNC: 30.7 GM/DL — LOW (ref 32–36)
MCV RBC AUTO: 78.3 FL — LOW (ref 80–100)
PHOSPHATE SERPL-MCNC: 3.6 MG/DL — SIGNIFICANT CHANGE UP (ref 2.5–4.5)
PLATELET # BLD AUTO: 209 K/UL — SIGNIFICANT CHANGE UP (ref 150–400)
POTASSIUM SERPL-MCNC: 2.8 MMOL/L — CRITICAL LOW (ref 3.5–5.3)
POTASSIUM SERPL-SCNC: 2.8 MMOL/L — CRITICAL LOW (ref 3.5–5.3)
RBC # BLD: 3.83 M/UL — LOW (ref 4.2–5.8)
RBC # FLD: 18.5 % — HIGH (ref 10.3–14.5)
SODIUM SERPL-SCNC: 139 MMOL/L — SIGNIFICANT CHANGE UP (ref 135–145)
WBC # BLD: 8.18 K/UL — SIGNIFICANT CHANGE UP (ref 3.8–10.5)
WBC # FLD AUTO: 8.18 K/UL — SIGNIFICANT CHANGE UP (ref 3.8–10.5)

## 2021-09-16 PROCEDURE — 99232 SBSQ HOSP IP/OBS MODERATE 35: CPT

## 2021-09-16 RX ORDER — POTASSIUM CHLORIDE 20 MEQ
10 PACKET (EA) ORAL
Refills: 0 | Status: COMPLETED | OUTPATIENT
Start: 2021-09-16 | End: 2021-09-16

## 2021-09-16 RX ADMIN — LACTULOSE 10 GRAM(S): 10 SOLUTION ORAL at 06:22

## 2021-09-16 RX ADMIN — PANTOPRAZOLE SODIUM 40 MILLIGRAM(S): 20 TABLET, DELAYED RELEASE ORAL at 09:31

## 2021-09-16 RX ADMIN — Medication 1 MILLIGRAM(S): at 13:17

## 2021-09-16 RX ADMIN — LACTULOSE 10 GRAM(S): 10 SOLUTION ORAL at 13:17

## 2021-09-16 RX ADMIN — Medication 1 TABLET(S): at 09:31

## 2021-09-16 RX ADMIN — Medication 1 MILLIGRAM(S): at 21:22

## 2021-09-16 RX ADMIN — Medication 100 MILLIEQUIVALENT(S): at 10:32

## 2021-09-16 RX ADMIN — Medication 100 MILLIEQUIVALENT(S): at 09:30

## 2021-09-16 RX ADMIN — Medication 100 MILLIEQUIVALENT(S): at 11:30

## 2021-09-16 RX ADMIN — Medication 1.5 MILLIGRAM(S): at 06:22

## 2021-09-16 RX ADMIN — Medication 100 MILLIGRAM(S): at 09:31

## 2021-09-16 RX ADMIN — ENOXAPARIN SODIUM 40 MILLIGRAM(S): 100 INJECTION SUBCUTANEOUS at 09:30

## 2021-09-16 RX ADMIN — Medication 1 MILLIGRAM(S): at 17:23

## 2021-09-16 RX ADMIN — Medication 1 MILLIGRAM(S): at 09:31

## 2021-09-16 RX ADMIN — Medication 1.5 MILLIGRAM(S): at 09:30

## 2021-09-16 RX ADMIN — LACTULOSE 10 GRAM(S): 10 SOLUTION ORAL at 21:21

## 2021-09-16 NOTE — PROGRESS NOTE ADULT - ASSESSMENT
35 y.o. male with seizure Dx in ETOH WD    1. AMS - metabolic/hepatic encephalopathy due to ETOH withdrawal + ETOH liver Dx - resolved   - ativan taper, lactulose, xifaxan   - thiamine for presumed thiamine deficiency    2. Hypokalemia - repleted    3. Elevated creatinine - no ADRIANE, resolved    4. Abn TSH - outpt follow up    5. VTE proph - LMWH    6. GI proph - PPI    Trnasfer to telemetry due to persistent tachycardia

## 2021-09-17 LAB
ANION GAP SERPL CALC-SCNC: 8 MMOL/L — SIGNIFICANT CHANGE UP (ref 5–17)
BUN SERPL-MCNC: 1 MG/DL — LOW (ref 7–23)
CALCIUM SERPL-MCNC: 7.9 MG/DL — LOW (ref 8.5–10.1)
CHLORIDE SERPL-SCNC: 109 MMOL/L — HIGH (ref 96–108)
CO2 SERPL-SCNC: 24 MMOL/L — SIGNIFICANT CHANGE UP (ref 22–31)
CREAT SERPL-MCNC: 0.5 MG/DL — SIGNIFICANT CHANGE UP (ref 0.5–1.3)
GLUCOSE SERPL-MCNC: 350 MG/DL — HIGH (ref 70–99)
HCT VFR BLD CALC: 30.6 % — LOW (ref 39–50)
HGB BLD-MCNC: 9.3 G/DL — LOW (ref 13–17)
MAGNESIUM SERPL-MCNC: 1.8 MG/DL — SIGNIFICANT CHANGE UP (ref 1.6–2.6)
MCHC RBC-ENTMCNC: 24.2 PG — LOW (ref 27–34)
MCHC RBC-ENTMCNC: 30.4 GM/DL — LOW (ref 32–36)
MCV RBC AUTO: 79.7 FL — LOW (ref 80–100)
PHOSPHATE SERPL-MCNC: 3.4 MG/DL — SIGNIFICANT CHANGE UP (ref 2.5–4.5)
PLATELET # BLD AUTO: 229 K/UL — SIGNIFICANT CHANGE UP (ref 150–400)
POTASSIUM SERPL-MCNC: 2.6 MMOL/L — CRITICAL LOW (ref 3.5–5.3)
POTASSIUM SERPL-SCNC: 2.6 MMOL/L — CRITICAL LOW (ref 3.5–5.3)
RBC # BLD: 3.84 M/UL — LOW (ref 4.2–5.8)
RBC # FLD: 19.3 % — HIGH (ref 10.3–14.5)
SODIUM SERPL-SCNC: 141 MMOL/L — SIGNIFICANT CHANGE UP (ref 135–145)
WBC # BLD: 7.3 K/UL — SIGNIFICANT CHANGE UP (ref 3.8–10.5)
WBC # FLD AUTO: 7.3 K/UL — SIGNIFICANT CHANGE UP (ref 3.8–10.5)

## 2021-09-17 PROCEDURE — 99232 SBSQ HOSP IP/OBS MODERATE 35: CPT

## 2021-09-17 RX ORDER — IBUPROFEN 200 MG
400 TABLET ORAL ONCE
Refills: 0 | Status: COMPLETED | OUTPATIENT
Start: 2021-09-17 | End: 2021-09-17

## 2021-09-17 RX ORDER — LACTULOSE 10 G/15ML
10 SOLUTION ORAL DAILY
Refills: 0 | Status: DISCONTINUED | OUTPATIENT
Start: 2021-09-17 | End: 2021-09-20

## 2021-09-17 RX ORDER — MAGNESIUM SULFATE 500 MG/ML
2 VIAL (ML) INJECTION ONCE
Refills: 0 | Status: COMPLETED | OUTPATIENT
Start: 2021-09-17 | End: 2021-09-17

## 2021-09-17 RX ORDER — POTASSIUM CHLORIDE 20 MEQ
10 PACKET (EA) ORAL
Refills: 0 | Status: COMPLETED | OUTPATIENT
Start: 2021-09-17 | End: 2021-09-17

## 2021-09-17 RX ADMIN — Medication 100 MILLIEQUIVALENT(S): at 11:29

## 2021-09-17 RX ADMIN — LACTULOSE 10 GRAM(S): 10 SOLUTION ORAL at 05:44

## 2021-09-17 RX ADMIN — Medication 100 MILLIEQUIVALENT(S): at 10:15

## 2021-09-17 RX ADMIN — Medication 1 MILLIGRAM(S): at 05:44

## 2021-09-17 RX ADMIN — ENOXAPARIN SODIUM 40 MILLIGRAM(S): 100 INJECTION SUBCUTANEOUS at 10:32

## 2021-09-17 RX ADMIN — Medication 100 MILLIEQUIVALENT(S): at 08:40

## 2021-09-17 RX ADMIN — Medication 0.5 MILLIGRAM(S): at 23:05

## 2021-09-17 RX ADMIN — Medication 1 MILLIGRAM(S): at 10:33

## 2021-09-17 RX ADMIN — Medication 1 TABLET(S): at 10:32

## 2021-09-17 RX ADMIN — Medication 400 MILLIGRAM(S): at 23:04

## 2021-09-17 RX ADMIN — Medication 1 MILLIGRAM(S): at 02:20

## 2021-09-17 RX ADMIN — PANTOPRAZOLE SODIUM 40 MILLIGRAM(S): 20 TABLET, DELAYED RELEASE ORAL at 10:32

## 2021-09-17 RX ADMIN — Medication 100 MILLIGRAM(S): at 10:32

## 2021-09-17 RX ADMIN — Medication 0.5 MILLIGRAM(S): at 13:12

## 2021-09-17 RX ADMIN — LACTULOSE 10 GRAM(S): 10 SOLUTION ORAL at 14:27

## 2021-09-17 RX ADMIN — Medication 50 GRAM(S): at 08:59

## 2021-09-17 NOTE — PROGRESS NOTE ADULT - ASSESSMENT
35 y.o. male with seizure Dx in ETOH WD    1. AMS - metabolic/hepatic encephalopathy due to ETOH withdrawal + ETOH liver Dx - resolved   - ativan taper - changed to po, lactulose, xifaxan   - thiamine for presumed thiamine deficiency    2. Hypokalemia - repleted    3. Elevated creatinine - no ADRIANE, resolved    4. Abn TSH - outpt follow up    5. VTE proph - LMWH    6. GI proph - PPI    Transfer to med surg

## 2021-09-18 LAB
ALBUMIN SERPL ELPH-MCNC: 3.2 G/DL — LOW (ref 3.3–5)
ALP SERPL-CCNC: 99 U/L — SIGNIFICANT CHANGE UP (ref 40–120)
ALT FLD-CCNC: 74 U/L — SIGNIFICANT CHANGE UP (ref 12–78)
AMMONIA BLD-MCNC: 33 UMOL/L — HIGH (ref 11–32)
ANION GAP SERPL CALC-SCNC: 4 MMOL/L — LOW (ref 5–17)
AST SERPL-CCNC: 84 U/L — HIGH (ref 15–37)
BILIRUB SERPL-MCNC: 0.6 MG/DL — SIGNIFICANT CHANGE UP (ref 0.2–1.2)
BUN SERPL-MCNC: 3 MG/DL — LOW (ref 7–23)
CALCIUM SERPL-MCNC: 8.6 MG/DL — SIGNIFICANT CHANGE UP (ref 8.5–10.1)
CHLORIDE SERPL-SCNC: 104 MMOL/L — SIGNIFICANT CHANGE UP (ref 96–108)
CO2 SERPL-SCNC: 29 MMOL/L — SIGNIFICANT CHANGE UP (ref 22–31)
CREAT SERPL-MCNC: 0.49 MG/DL — LOW (ref 0.5–1.3)
GLUCOSE SERPL-MCNC: 97 MG/DL — SIGNIFICANT CHANGE UP (ref 70–99)
HCT VFR BLD CALC: 31.5 % — LOW (ref 39–50)
HGB BLD-MCNC: 9.8 G/DL — LOW (ref 13–17)
MAGNESIUM SERPL-MCNC: 2.2 MG/DL — SIGNIFICANT CHANGE UP (ref 1.6–2.6)
MCHC RBC-ENTMCNC: 24.8 PG — LOW (ref 27–34)
MCHC RBC-ENTMCNC: 31.1 GM/DL — LOW (ref 32–36)
MCV RBC AUTO: 79.7 FL — LOW (ref 80–100)
PLATELET # BLD AUTO: 273 K/UL — SIGNIFICANT CHANGE UP (ref 150–400)
POTASSIUM SERPL-MCNC: 2.9 MMOL/L — CRITICAL LOW (ref 3.5–5.3)
POTASSIUM SERPL-SCNC: 2.9 MMOL/L — CRITICAL LOW (ref 3.5–5.3)
PROT SERPL-MCNC: 7.3 GM/DL — SIGNIFICANT CHANGE UP (ref 6–8.3)
RBC # BLD: 3.95 M/UL — LOW (ref 4.2–5.8)
RBC # FLD: 19.8 % — HIGH (ref 10.3–14.5)
SODIUM SERPL-SCNC: 137 MMOL/L — SIGNIFICANT CHANGE UP (ref 135–145)
WBC # BLD: 8.09 K/UL — SIGNIFICANT CHANGE UP (ref 3.8–10.5)
WBC # FLD AUTO: 8.09 K/UL — SIGNIFICANT CHANGE UP (ref 3.8–10.5)

## 2021-09-18 PROCEDURE — 99232 SBSQ HOSP IP/OBS MODERATE 35: CPT

## 2021-09-18 RX ORDER — POTASSIUM CHLORIDE 20 MEQ
40 PACKET (EA) ORAL EVERY 4 HOURS
Refills: 0 | Status: COMPLETED | OUTPATIENT
Start: 2021-09-18 | End: 2021-09-18

## 2021-09-18 RX ORDER — PANTOPRAZOLE SODIUM 20 MG/1
40 TABLET, DELAYED RELEASE ORAL DAILY
Refills: 0 | Status: DISCONTINUED | OUTPATIENT
Start: 2021-09-18 | End: 2021-09-20

## 2021-09-18 RX ORDER — POTASSIUM CHLORIDE 20 MEQ
10 PACKET (EA) ORAL
Refills: 0 | Status: COMPLETED | OUTPATIENT
Start: 2021-09-18 | End: 2021-09-18

## 2021-09-18 RX ORDER — ONDANSETRON 8 MG/1
4 TABLET, FILM COATED ORAL ONCE
Refills: 0 | Status: COMPLETED | OUTPATIENT
Start: 2021-09-18 | End: 2021-09-18

## 2021-09-18 RX ADMIN — Medication 100 MILLIEQUIVALENT(S): at 16:05

## 2021-09-18 RX ADMIN — Medication 100 MILLIEQUIVALENT(S): at 12:49

## 2021-09-18 RX ADMIN — Medication 400 MILLIGRAM(S): at 00:00

## 2021-09-18 RX ADMIN — ONDANSETRON 4 MILLIGRAM(S): 8 TABLET, FILM COATED ORAL at 20:19

## 2021-09-18 RX ADMIN — ENOXAPARIN SODIUM 40 MILLIGRAM(S): 100 INJECTION SUBCUTANEOUS at 09:42

## 2021-09-18 RX ADMIN — Medication 0.5 MILLIGRAM(S): at 18:08

## 2021-09-18 RX ADMIN — LACTULOSE 10 GRAM(S): 10 SOLUTION ORAL at 09:41

## 2021-09-18 RX ADMIN — Medication 40 MILLIEQUIVALENT(S): at 06:47

## 2021-09-18 RX ADMIN — Medication 100 MILLIEQUIVALENT(S): at 14:16

## 2021-09-18 RX ADMIN — Medication 1 TABLET(S): at 09:41

## 2021-09-18 RX ADMIN — Medication 1 MILLIGRAM(S): at 09:41

## 2021-09-18 RX ADMIN — PANTOPRAZOLE SODIUM 40 MILLIGRAM(S): 20 TABLET, DELAYED RELEASE ORAL at 09:41

## 2021-09-18 RX ADMIN — Medication 0.5 MILLIGRAM(S): at 05:04

## 2021-09-18 NOTE — PROGRESS NOTE ADULT - ASSESSMENT
35 y.o. male with seizure Dx in ETOH WD    1. AMS - metabolic/hepatic encephalopathy due to ETOH withdrawal + ETOH liver Dx - resolved   - ativan taper - changed to po, lactulose, xifaxan   - thiamine for presumed thiamine deficiency    2. Hypokalemia - repleted  persistent  consult Nephrology  24H urine for K      4. Abn TSH - outpt follow up    5. VTE proph - LMWH    6. GI proph - PPI    Transfer to med surg 35 y.o. male with seizure Dx in ETOH WD    1. AMS - metabolic/hepatic encephalopathy due to ETOH withdrawal + ETOH liver Dx - resolved   - ativan taper - changed to po, lactulose, xifaxan   - thiamine for presumed thiamine deficiency    2. Hypokalemia - repleted  persistent  consult Nephrology  24H urine for K    3. Left hydro outpt eval/ [facility]      4. Abn TSH - outpt follow up    5. VTE proph - LMWH    6. GI proph - PPI    Transfer to med surg

## 2021-09-19 LAB
ANION GAP SERPL CALC-SCNC: 7 MMOL/L — SIGNIFICANT CHANGE UP (ref 5–17)
BUN SERPL-MCNC: 4 MG/DL — LOW (ref 7–23)
CALCIUM SERPL-MCNC: 8.9 MG/DL — SIGNIFICANT CHANGE UP (ref 8.5–10.1)
CHLORIDE SERPL-SCNC: 104 MMOL/L — SIGNIFICANT CHANGE UP (ref 96–108)
CO2 SERPL-SCNC: 27 MMOL/L — SIGNIFICANT CHANGE UP (ref 22–31)
CREAT SERPL-MCNC: 0.42 MG/DL — LOW (ref 0.5–1.3)
CULTURE RESULTS: SIGNIFICANT CHANGE UP
CULTURE RESULTS: SIGNIFICANT CHANGE UP
GLUCOSE SERPL-MCNC: 89 MG/DL — SIGNIFICANT CHANGE UP (ref 70–99)
POTASSIUM SERPL-MCNC: 3.4 MMOL/L — LOW (ref 3.5–5.3)
POTASSIUM SERPL-SCNC: 3.4 MMOL/L — LOW (ref 3.5–5.3)
SODIUM SERPL-SCNC: 138 MMOL/L — SIGNIFICANT CHANGE UP (ref 135–145)
SPECIMEN SOURCE: SIGNIFICANT CHANGE UP
SPECIMEN SOURCE: SIGNIFICANT CHANGE UP

## 2021-09-19 PROCEDURE — 99232 SBSQ HOSP IP/OBS MODERATE 35: CPT

## 2021-09-19 RX ORDER — POTASSIUM CHLORIDE 20 MEQ
40 PACKET (EA) ORAL ONCE
Refills: 0 | Status: COMPLETED | OUTPATIENT
Start: 2021-09-19 | End: 2021-09-19

## 2021-09-19 RX ADMIN — PANTOPRAZOLE SODIUM 40 MILLIGRAM(S): 20 TABLET, DELAYED RELEASE ORAL at 10:35

## 2021-09-19 RX ADMIN — Medication 40 MILLIEQUIVALENT(S): at 10:35

## 2021-09-19 RX ADMIN — LACTULOSE 10 GRAM(S): 10 SOLUTION ORAL at 10:35

## 2021-09-19 RX ADMIN — Medication 1 MILLIGRAM(S): at 10:35

## 2021-09-19 RX ADMIN — Medication 1 TABLET(S): at 10:35

## 2021-09-19 RX ADMIN — ENOXAPARIN SODIUM 40 MILLIGRAM(S): 100 INJECTION SUBCUTANEOUS at 10:35

## 2021-09-19 RX ADMIN — Medication 0.5 MILLIGRAM(S): at 10:35

## 2021-09-19 NOTE — CONSULT NOTE ADULT - SUBJECTIVE AND OBJECTIVE BOX
Chief complaints.: admitted on 9/14 after being found on street confused, ETOH withdrawal.     phone used #[phone].  HPI:  37 yo man with PMHX of seizure dz admitted post police found [place] on street " digging in dirt"  Admitted to ICU for CIWA protocol with IV ativan and kept NPO and given Lactulose for high Ammonia level.  Noted for ADRIANE on admission with improvement with IVF.  Transferred to  for further care,  Nephrology evaluation for persistent Hypokalemia.  Even, via  phone, pt exhibits very little insight to his disease.  However, does indicate he does not eat well due to intermittent feelings of nausea, even prior to admission.   Knows place but not oriented to year.    PMHX and PSHX.  Seizure dz.    FAMILY HISTORY:  Family history unobtainable due to patient&#x27;s condition    SOCIAL HISTORY : Denies smoking,  Positive ETOH--vague.  Allergies    No Known Allergies    REVIEW OF SYSTEMS ;  Denies SOB  Denies abdominal pain  Intermittent episodes of nausea    MEDICATIONS  (STANDING):  enoxaparin Injectable 40 milliGRAM(s) SubCutaneous daily  folic acid 1 milliGRAM(s) Oral daily  lactulose Syrup 10 Gram(s) Oral daily  multivitamin 1 Tablet(s) Oral daily  pantoprazole    Tablet 40 milliGRAM(s) Oral daily  rifAXIMin 550 milliGRAM(s) Oral two times a day    MEDICATIONS  (PRN):  LORazepam   Injectable 2 milliGRAM(s) IV Push every 2 hours PRN CIWA-Ar score increase by 2 points and a total score of 7 or less  LORazepam   Injectable 2 milliGRAM(s) IV Push every 1 hour PRN Symptom-triggered: each CIWA -Ar score 8 or GREATER      Vital Signs Last 24 Hrs  T(C): 37.3 (19 Sep 2021 12:16), Max: 37.3 (19 Sep 2021 12:16)  T(F): 99.2 (19 Sep 2021 12:16), Max: 99.2 (19 Sep 2021 12:16)  HR: 76 (19 Sep 2021 12:16) (71 - 76)  BP: 123/75 (19 Sep 2021 12:16) (120/80 - 144/82)  BP(mean): --  RR: 16 (19 Sep 2021 12:16) (16 - 18)  SpO2: 100% (19 Sep 2021 12:16) (100% - 100%)    18 Sep 2021 07:01  -  19 Sep 2021 07:00  --------------------------------------------------------  IN: 150 mL / OUT: 400 mL / NET: -250 mL    19 Sep 2021 07:01  -  19 Sep 2021 16:36  --------------------------------------------------------  IN: 780 mL / OUT: 300 mL / NET: 480 mL    PHYSICAL EXAM:  GEN: comfortable  HEENT: WNL  NECK : supple  CV: S1S2 RRR  LUNGS: Clear to aus  ABD: soft  EXT: no edema    LABS:                        9.8    8.09  )-----------( 273      ( 18 Sep 2021 05:31 )             31.5     09-19    138  |  104  |  4<L>  ----------------------------<  89  3.4<L>   |  27  |  0.42<L>    Ca    8.9      19 Sep 2021 05:48  Mg     2.2     09-18    TPro  7.3  /  Alb  3.2<L>  /  TBili  0.6  /  DBili  x   /  AST  84<H>  /  ALT  74  /  AlkPhos  99  09-18

## 2021-09-19 NOTE — CONSULT NOTE ADULT - ASSESSMENT
35 yo with hx fo Sz dz admitted with ETOH withdrawal.  Post CIWA protocol.   Now out of ICU with resumed po diet.  ADRIANE improved with IVF.  Persistent Hypokalemia due to prolonged poor intake and GI loss with lactulose.  Likely not primary renal wasting.  D/c IVF  Monitor with improved po intake.

## 2021-09-19 NOTE — PROVIDER CONTACT NOTE (CRITICAL VALUE NOTIFICATION) - SITUATION
Physical Therapy  [address]  Acute Rehabilitation Physical Therapy Progress Note    Date: 21  Patient Name: Olimpia Medina       Room: 0925/0311-52  MRN: [id number]   Account: [de-identified]   : 1939  (80 y.o.) Gender: male     Referring Practitioner: Arely Banks MD  Diagnosis: Left hip fracture, hospitalization prolonged (initial admit 8-3)  due to afib, rhabdomyolosis, GI bleed, s/p EGD, on Protonix, twice a day,  Past Medical History:  has a past medical history of Atrial fibrillation ([facility] [address] Past Surgical History:   has a past surgical history that includes hip surgery (Left, 2021) and Upper gastrointestinal endoscopy (N/A, 8/10/2021). Additional Pertinent Hx: closed L hip fx       Restrictions/Precautions  Restrictions/Precautions: Weight Bearing; Fall Risk  Required Braces or Orthoses?: No  Implants present? : Metal implants (L hemiarthroplasty )  Lower Extremity Weight Bearing Restrictions  Left Lower Extremity Weight Bearing: Weight Bearing As Tolerated    Vital Signs  Patient Currently in Pain: Denies                   Bed Mobility:   Bed Mobility  Bridging: Modified independent   Rolling: Modified independent;Rolling Left;Rolling Right  Supine to Sit: Modified independent  Sit to Supine: Modified independent  Scooting: Modified independent  Comment: Flat mat, 2 pillows  Bed mobility  Bridging: Modified independent   Scooting: Modified independent    Transfers:  Sit to Stand: Modified independent  Stand to sit: Modified independent              WB Status: WBAT  Ambulation 1  Surface: level tile  Device: Rolling Walker  Assistance: Modified Independent  Gait Deviations: Decreased step length;Decreased step height;Slow Danuta  Distance: 150ft  Ambulation 2  Surface - 2: level tile;ramp  Device 2: Rolling Walker  Assistance 2: Modified Independent  Quality of Gait 2: reminders to correct forward flexed posture and look up  Gait Deviations: Increased FAITH; Decreased step
length;Decreased step height  Distance: 167ft  Ambulation 3  Surface - 3: level tile;carpet  Device 3: Cane Single point cane  Assistance 3: Contact guard assistance  Quality of Gait 3: Pt more shaky/unsteady however did not experience any LOB  Gait Deviations: Slow Danuta;Decreased step length;Decreased step height  Distance: 75ft  Comments: Discussed with pt that he is more safe to continue using RW vs s.c. when he gets home. Stairs/Curb  Stairs?: Yes  Stairs  # Steps : 16  Stairs Height: 8\"  Rails: Right ascending  Device: Single pt cane  Assistance: Modified independent   Comment: seated rest break at top of steps before descending       BALANCE Posture: Fair  Sitting - Static: Good  Sitting - Dynamic: Good;-  Standing - Static: Good (w/RW)  Standing - Dynamic: Good;- (w/RW)    EXERCISES    Other exercises?: Yes  Other exercises 1: Supine and hooklying  exercises: LLE 15x (Pt issued HEP as well)  Other exercises 2: Bridges x 10  Other exercises 3: Seated (B) LE ex 1.5# x 10, including lime green band (Pt issued HEP)  Other exercises 4: Standing (B) LE ex x 10 (Pt issued HEP)     PT Equipment Recommendations  Equipment Needed: Yes  Mobility Devices: Jagruti Flash: Rolling       Current Treatment Recommendations: Strengthening, ROM, Balance Training, Functional Mobility Training, Transfer Training, Stair training, Gait Training, Endurance Training, Safety Education & Training, Patient/Caregiver Education & Training, Home Exercise Program    Conditions Requiring Skilled Therapeutic Intervention  Body structures, Functions, Activity limitations: Decreased functional mobility ; Decreased ROM; Decreased strength;Decreased endurance;Decreased balance  Decision Making: Low Complexity  History: Hip fracture  REQUIRES PT FOLLOW UP: Yes  Discharge Recommendations: Home with assist PRN    Goals  Short term goals  Time Frame for Short term goals: 7-10 days  Short term goal 1: bed mobility with SBA  Short term goal 2:
Pts magnesium is low 0.8mg
transfers with supervision  Short term goal 3: gait with RW/SBA x 75-100ft  Short term goal 4: standing balance to Fair+ for ADLs  Long term goals  Time Frame for Long term goals : time of discharge  Long term goal 1: mod-I bed mobility  Long term goal 2: mod-I transfers  Long term goal 3: mod-I gait x 150ft with RW  Long term goal 4: flight of stairs with rail and cane mod-I  Long term goal 5: standing balance to Good for safe ADLs       08/26/21 0853 08/26/21 1224   PT Individual Minutes   Time In 0803 1220   Time Out 0853 1300   Minutes 50 40       Electronically signed by Jeison Rodgers PTA on 8/26/21 at 1:04 PM EDT
Pts Magnesium 0.8
Low Potassium of 2.9 this AM

## 2021-09-20 VITALS
TEMPERATURE: 99 F | OXYGEN SATURATION: 100 % | HEART RATE: 69 BPM | SYSTOLIC BLOOD PRESSURE: 134 MMHG | RESPIRATION RATE: 16 BRPM | DIASTOLIC BLOOD PRESSURE: 89 MMHG

## 2021-09-20 LAB
ALBUMIN SERPL ELPH-MCNC: 3.5 G/DL — SIGNIFICANT CHANGE UP (ref 3.3–5)
ALP SERPL-CCNC: 131 U/L — HIGH (ref 40–120)
ALT FLD-CCNC: 102 U/L — HIGH (ref 12–78)
AMMONIA BLD-MCNC: 39 UMOL/L — HIGH (ref 11–32)
ANION GAP SERPL CALC-SCNC: 2 MMOL/L — LOW (ref 5–17)
AST SERPL-CCNC: 95 U/L — HIGH (ref 15–37)
BASOPHILS # BLD AUTO: 0.22 K/UL — HIGH (ref 0–0.2)
BASOPHILS NFR BLD AUTO: 2.5 % — HIGH (ref 0–2)
BILIRUB SERPL-MCNC: 0.4 MG/DL — SIGNIFICANT CHANGE UP (ref 0.2–1.2)
BUN SERPL-MCNC: 9 MG/DL — SIGNIFICANT CHANGE UP (ref 7–23)
CALCIUM SERPL-MCNC: 9 MG/DL — SIGNIFICANT CHANGE UP (ref 8.5–10.1)
CHLORIDE SERPL-SCNC: 103 MMOL/L — SIGNIFICANT CHANGE UP (ref 96–108)
CO2 SERPL-SCNC: 30 MMOL/L — SIGNIFICANT CHANGE UP (ref 22–31)
CREAT SERPL-MCNC: 0.51 MG/DL — SIGNIFICANT CHANGE UP (ref 0.5–1.3)
EOSINOPHIL # BLD AUTO: 0.22 K/UL — SIGNIFICANT CHANGE UP (ref 0–0.5)
EOSINOPHIL NFR BLD AUTO: 2.5 % — SIGNIFICANT CHANGE UP (ref 0–6)
GLUCOSE SERPL-MCNC: 98 MG/DL — SIGNIFICANT CHANGE UP (ref 70–99)
HCT VFR BLD CALC: 34.6 % — LOW (ref 39–50)
HGB BLD-MCNC: 10.7 G/DL — LOW (ref 13–17)
LYMPHOCYTES # BLD AUTO: 2.01 K/UL — SIGNIFICANT CHANGE UP (ref 1–3.3)
LYMPHOCYTES # BLD AUTO: 23 % — SIGNIFICANT CHANGE UP (ref 13–44)
MCHC RBC-ENTMCNC: 25.2 PG — LOW (ref 27–34)
MCHC RBC-ENTMCNC: 30.9 GM/DL — LOW (ref 32–36)
MCV RBC AUTO: 81.4 FL — SIGNIFICANT CHANGE UP (ref 80–100)
MONOCYTES # BLD AUTO: 1.23 K/UL — HIGH (ref 0–0.9)
MONOCYTES NFR BLD AUTO: 14 % — SIGNIFICANT CHANGE UP (ref 2–14)
NEUTROPHILS # BLD AUTO: 5.08 K/UL — SIGNIFICANT CHANGE UP (ref 1.8–7.4)
NEUTROPHILS NFR BLD AUTO: 58 % — SIGNIFICANT CHANGE UP (ref 43–77)
NRBC # BLD: SIGNIFICANT CHANGE UP /100 WBCS (ref 0–0)
PLATELET # BLD AUTO: 412 K/UL — HIGH (ref 150–400)
POTASSIUM SERPL-MCNC: 4.3 MMOL/L — SIGNIFICANT CHANGE UP (ref 3.5–5.3)
POTASSIUM SERPL-SCNC: 4.3 MMOL/L — SIGNIFICANT CHANGE UP (ref 3.5–5.3)
PROT SERPL-MCNC: 8 GM/DL — SIGNIFICANT CHANGE UP (ref 6–8.3)
RBC # BLD: 4.25 M/UL — SIGNIFICANT CHANGE UP (ref 4.2–5.8)
RBC # FLD: 20.9 % — HIGH (ref 10.3–14.5)
SARS-COV-2 RNA SPEC QL NAA+PROBE: SIGNIFICANT CHANGE UP
SODIUM SERPL-SCNC: 135 MMOL/L — SIGNIFICANT CHANGE UP (ref 135–145)
T3FREE SERPL-MCNC: 2.97 PG/ML — SIGNIFICANT CHANGE UP (ref 1.8–4.6)
T4 FREE SERPL-MCNC: 1.23 NG/DL — SIGNIFICANT CHANGE UP (ref 0.76–1.46)
TSH SERPL-MCNC: 5.01 UU/ML — HIGH (ref 0.34–4.82)
WBC # BLD: 8.75 K/UL — SIGNIFICANT CHANGE UP (ref 3.8–10.5)
WBC # FLD AUTO: 8.75 K/UL — SIGNIFICANT CHANGE UP (ref 3.8–10.5)

## 2021-09-20 PROCEDURE — 99239 HOSP IP/OBS DSCHRG MGMT >30: CPT

## 2021-09-20 RX ORDER — PANTOPRAZOLE SODIUM 20 MG/1
1 TABLET, DELAYED RELEASE ORAL
Qty: 30 | Refills: 0
Start: 2021-09-20 | End: 2021-10-19

## 2021-09-20 RX ORDER — LACTULOSE 10 G/15ML
15 SOLUTION ORAL
Qty: 450 | Refills: 0
Start: 2021-09-20 | End: 2021-10-19

## 2021-09-20 RX ORDER — FOLIC ACID 0.8 MG
1 TABLET ORAL
Qty: 30 | Refills: 0
Start: 2021-09-20 | End: 2021-10-19

## 2021-09-20 RX ADMIN — LACTULOSE 10 GRAM(S): 10 SOLUTION ORAL at 10:33

## 2021-09-20 RX ADMIN — Medication 1 MILLIGRAM(S): at 10:33

## 2021-09-20 RX ADMIN — PANTOPRAZOLE SODIUM 40 MILLIGRAM(S): 20 TABLET, DELAYED RELEASE ORAL at 10:33

## 2021-09-20 RX ADMIN — ENOXAPARIN SODIUM 40 MILLIGRAM(S): 100 INJECTION SUBCUTANEOUS at 10:34

## 2021-09-20 RX ADMIN — Medication 1 TABLET(S): at 10:33

## 2021-09-20 NOTE — DISCHARGE NOTE PROVIDER - NSDCMRMEDTOKEN_GEN_ALL_CORE_FT
folic acid 1 mg oral tablet: 1 tab(s) orally once a day  lactulose 10 g/15 mL oral syrup: 15 milliliter(s) orally once a day  Multiple Vitamins oral tablet: 1 tab(s) orally once a day  pantoprazole 40 mg oral delayed release tablet: 1 tab(s) orally once a day  rifAXIMin 550 mg oral tablet: 1 tab(s) orally 2 times a day

## 2021-09-20 NOTE — PROGRESS NOTE ADULT - ASSESSMENT
35 yo with hx fo Sz dz admitted with ETOH withdrawal.  Post CIWA protocol.   Now out of ICU with resumed po diet.  ADRIANE improved with IVF.  Persistent Hypokalemia due to prolonged poor intake and GI loss with lactulose.  Likely not primary renal wasting.  D/c IVF  Monitor with improved po intake.    9/20 SY  --Hypokalemia resolved    Monitor with po intake and off supplements.  --Fluid status stable.  --No active renal issues, will sign off, please call back if further follow up is indicated.

## 2021-09-20 NOTE — PROGRESS NOTE ADULT - REASON FOR ADMISSION
alcohol wd; AMS, ADRIANE

## 2021-09-20 NOTE — DISCHARGE NOTE NURSING/CASE MANAGEMENT/SOCIAL WORK - PATIENT PORTAL LINK FT
You can access the FollowMyHealth Patient Portal offered by [facility] by registering at the following website: [email or web address]. By joining [employer]’s FollowMyHealth portal, you will also be able to view your health information using other applications (apps) compatible with our system.

## 2021-09-20 NOTE — DISCHARGE NOTE PROVIDER - NSDCCPCAREPLAN_GEN_ALL_CORE_FT
PRINCIPAL DISCHARGE DIAGNOSIS  Diagnosis: Alcohol withdrawal  Assessment and Plan of Treatment: avoid alcohol consumption

## 2021-09-20 NOTE — PROGRESS NOTE ADULT - SUBJECTIVE AND OBJECTIVE BOX
CC: alcohol wd; AMS, ADRIANE     HPI:  35/M with PMHx of seizure disorder brought to the ED BIBEMS. EMS was called by police when pt was found on the side of the road "digging in dirt." Pt with known Hx of ETOH abuse, admits to drinking 3-5 bottles a day. It could not be ascertained as to bottles of what, whether beer or hard liquor.  He was admitted to stepdown etoh withdrawal/hepatic encephalopathy. treated with ativan/lactulose/XIFAXAN.   now transferred to .     pt seen and exmained this am. feels well. no complaints. tolerating po. denies n/v/d. NO f/c/r.     ROS: all 10 systems reviewed and is as above otherwise negative.     Vital Signs Last 24 Hrs  T(C): 36.7 (19 Sep 2021 09:00), Max: 37.5 (18 Sep 2021 12:38)  T(F): 98.1 (19 Sep 2021 09:00), Max: 99.5 (18 Sep 2021 12:38)  HR: 75 (19 Sep 2021 09:00) (68 - 88)  BP: 120/80 (19 Sep 2021 09:00) (111/73 - 144/82)  BP(mean): 87 (18 Sep 2021 14:52) (80 - 87)  RR: 16 (19 Sep 2021 09:00) (16 - 22)  SpO2: 100% (19 Sep 2021 09:00) (97% - 100%)      PHYSICAL EXAM:    GENERAL: Comfortable, no acute distress   HEAD:  Normocephalic, atraumatic  EYES: EOMI, PERRLA  HEENT: Moist mucous membranes  NECK: Supple, No JVD  NERVOUS SYSTEM:  non focal  CHEST/LUNG: Clear to auscultation bilaterally  HEART: Regular rate and rhythm  ABDOMEN: Soft,non tender, distended, Bowel sounds present  GENITOURINARY: Voiding, no palpable bladder  EXTREMITIES:   No clubbing, cyanosis, or edema  MUSCULOSKELETAL- No muscle tenderness, no joint tenderness  SKIN-no rash    LABS:                        9.8    8.09  )-----------( 273      ( 18 Sep 2021 05:31 )             31.5     09-19    138  |  104  |  4<L>  ----------------------------<  89  3.4<L>   |  27  |  0.42<L>    Ca    8.9      19 Sep 2021 05:48  Mg     2.2     09-18    TPro  7.3  /  Alb  3.2<L>  /  TBili  0.6  /  DBili  x   /  AST  84<H>  /  ALT  74  /  AlkPhos  99  09-18    US Abdomen Complete (US Abdomen Complete .) (09.15.21 @ 09:58) >  Fatty liver without morphologic evidence of cirrhosis.  Mild left hydronephrosis, etiology not seen.        ASSESSMENT AND PLAN:    1. AMS - metabolic/hepatic encephalopathy due to ETOH withdrawal + ETOH liver Dx - resolved   - ativan po, lactulose, xifaxan   - thiamine for presumed thiamine deficiency    2. Hypokalemia   - repleted  - improving    3. Left hydro outpt eval/ [facility]    4. Abn TSH   -repeat with free t3/4    5. VTE proph - LMWH    6. GI proph - PPI    7. Dispo:  dc planning for tomorrow if K+  stable.   
CC: alcohol wd; AMS, ADRIANE (14 Sep 2021 17:57)    HPI:  35/M with PMHx of seizure disorder brought to the ED BIBEMS. EMS was called by police when pt was found on the side of the road "digging in dirt." Pt with known Hx of ETOH abuse, admits to drinking 3-5 bottles a day. It could not be ascertained as to bottles of what, whether beer or hard liquor. Pt is confused at this time and trying to remove tele monitor.     CT head neg. (14 Sep 2021 16:26)    INTERVAL HPI/OVERNIGHT EVENTS: UTO, comfortable, sedated    Vital Signs Last 24 Hrs  T(C): 36.4 (15 Sep 2021 12:15), Max: 37.1 (14 Sep 2021 22:18)  T(F): 97.6 (15 Sep 2021 12:15), Max: 98.8 (14 Sep 2021 22:18)  HR: 90 (15 Sep 2021 13:00) (90 - 128)  BP: 104/84 (15 Sep 2021 13:00) (99/73 - 142/95)  BP(mean): 89 (15 Sep 2021 13:00) (77 - 108)  RR: 16 (15 Sep 2021 13:00) (14 - 20)  SpO2: 95% (15 Sep 2021 13:00) (91% - 100%)  I&O's Detail    14 Sep 2021 07:01  -  15 Sep 2021 07:00  --------------------------------------------------------  IN:  Total IN: 0 mL    OUT:    Voided (mL): 300 mL  Total OUT: 300 mL    Total NET: -300 mL      15 Sep 2021 07:01  -  15 Sep 2021 14:21  --------------------------------------------------------  IN:  Total IN: 0 mL    OUT:    Voided (mL): 650 mL  Total OUT: 650 mL    Total NET: -650 mL          CARDIAC MARKERS ( 14 Sep 2021 20:14 )  <0.015 ng/mL / x     / x     / x     / x      CARDIAC MARKERS ( 14 Sep 2021 16:55 )  <0.015 ng/mL / x     / x     / x     / x                                9.5    9.05  )-----------( 170      ( 15 Sep 2021 07:40 )             30.9     15 Sep 2021 07:40    141    |  111    |  7      ----------------------------<  99     3.0     |  24     |  0.46     Ca    8.1        15 Sep 2021 07:40  Phos  2.3       15 Sep 2021 07:40  Mg     2.0       15 Sep 2021 07:40    TPro  7.1    /  Alb  3.3    /  TBili  1.2    /  DBili  x      /  AST  109    /  ALT  58     /  AlkPhos  84     15 Sep 2021 07:40    PT/INR - ( 14 Sep 2021 16:55 )   PT: 13.9 sec;   INR: 1.20 ratio         PTT - ( 14 Sep 2021 16:55 )  PTT:32.6 sec    LIVER FUNCTIONS - ( 15 Sep 2021 07:40 )  Alb: 3.3 g/dL / Pro: 7.1 gm/dL / ALK PHOS: 84 U/L / ALT: 58 U/L / AST: 109 U/L / GGT: x           Urinalysis Basic - ( 14 Sep 2021 15:57 )    Color: Pastora / Appearance: Clear / S.020 / pH: x  Gluc: x / Ketone: Small  / Bili: Small / Urobili: 4 mg/dL   Blood: x / Protein: 100 mg/dL / Nitrite: Negative   Leuk Esterase: Trace / RBC: 3-5 /HPF / WBC 3-5   Sq Epi: x / Non Sq Epi: Occasional / Bacteria: Occasional        MEDICATIONS  (STANDING):  dextrose 5% + sodium chloride 0.9%. 1000 milliLiter(s) (83 mL/Hr) IV Continuous <Continuous>  enoxaparin Injectable 40 milliGRAM(s) SubCutaneous daily  folic acid 1 milliGRAM(s) Oral daily  lactulose Syrup 10 Gram(s) Oral three times a day  LORazepam   Injectable 1.5 milliGRAM(s) IV Push every 4 hours  LORazepam   Injectable   IV Push   multivitamin 1 Tablet(s) Oral daily  pantoprazole  Injectable 40 milliGRAM(s) IV Push daily  rifAXIMin 550 milliGRAM(s) Oral two times a day  thiamine 100 milliGRAM(s) Oral daily    MEDICATIONS  (PRN):  LORazepam   Injectable 2 milliGRAM(s) IV Push every 2 hours PRN CIWA-Ar score increase by 2 points and a total score of 7 or less  LORazepam   Injectable 2 milliGRAM(s) IV Push every 1 hour PRN Symptom-triggered: each CIWA -Ar score 8 or GREATER    RADIOLOGY & ADDITIONAL TESTS: personally visualized    PHYSICAL EXAM:    General: sedated male  in no acute distress  Eyes: conjunctiva and sclera clear  Head: Normocephalic; atraumatic  ENMT: No nasal discharge; airway clear  Neck: Supple; non tender; no masses  Respiratory: No wheezes, rales or rhonchi  Cardiovascular: S1, S2 reg  Gastrointestinal: Soft non-tender non-distended; Normal bowel sounds  Genitourinary: No costovertebral angle tenderness  Extremities: No clubbing, cyanosis or edema  Vascular: Peripheral pulses palpable 2+ bilaterally  Neurological: Sedated, lethargic, confused  Skin: Warm and dry.   Musculoskeletal: Normal tone, without deformities  
CC: alcohol wd; AMS, ADRIANE (14 Sep 2021 17:57)    HPI:  35/M with PMHx of seizure disorder brought to the ED BIBEMS. EMS was called by police when pt was found on the side of the road "digging in dirt." Pt with known Hx of ETOH abuse, admits to drinking 3-5 bottles a day. It could not be ascertained as to bottles of what, whether beer or hard liquor. Pt is confused at this time and trying to remove tele monitor.     CT head neg. (14 Sep 2021 16:26)    INTERVAL HPI/OVERNIGHT EVENTS: no complaints, does not require PRN, tachycardic  Other ROS reviewed and neg     Vital Signs Last 24 Hrs  T(C): 37.1 (16 Sep 2021 04:11), Max: 37.8 (16 Sep 2021 00:17)  T(F): 98.8 (16 Sep 2021 04:11), Max: 100 (16 Sep 2021 00:17)  HR: 105 (16 Sep 2021 14:00) (86 - 117)  BP: 129/88 (16 Sep 2021 14:00) (99/58 - 132/65)  BP(mean): 98 (16 Sep 2021 14:00) (69 - 100)  RR: 21 (16 Sep 2021 14:00) (15 - 25)  SpO2: 95% (16 Sep 2021 11:00) (89% - 97%)  I&O's Detail    15 Sep 2021 07:01  -  16 Sep 2021 07:00  --------------------------------------------------------  IN:  Total IN: 0 mL    OUT:    Voided (mL): 650 mL  Total OUT: 650 mL    Total NET: -650 mL    CARDIAC MARKERS ( 14 Sep 2021 20:14 )  <0.015 ng/mL / x     / x     / x     / x      CARDIAC MARKERS ( 14 Sep 2021 16:55 )  <0.015 ng/mL / x     / x     / x     / x                            9.2    8.18  )-----------( 209      ( 16 Sep 2021 06:30 )             30.0     16 Sep 2021 06:30    139    |  105    |  1      ----------------------------<  99     2.8     |  26     |  0.37     Ca    8.6        16 Sep 2021 06:30  Phos  3.6       16 Sep 2021 06:30  Mg     1.8       16 Sep 2021 06:30    TPro  7.1    /  Alb  3.3    /  TBili  1.2    /  DBili  x      /  AST  109    /  ALT  58     /  AlkPhos  84     15 Sep 2021 07:40    PT/INR - ( 14 Sep 2021 16:55 )   PT: 13.9 sec;   INR: 1.20 ratio         PTT - ( 14 Sep 2021 16:55 )  PTT:32.6 sec    LIVER FUNCTIONS - ( 15 Sep 2021 07:40 )  Alb: 3.3 g/dL / Pro: 7.1 gm/dL / ALK PHOS: 84 U/L / ALT: 58 U/L / AST: 109 U/L / GGT: x           Urinalysis Basic - ( 14 Sep 2021 15:57 )    Color: Pastora / Appearance: Clear / S.020 / pH: x  Gluc: x / Ketone: Small  / Bili: Small / Urobili: 4 mg/dL   Blood: x / Protein: 100 mg/dL / Nitrite: Negative   Leuk Esterase: Trace / RBC: 3-5 /HPF / WBC 3-5   Sq Epi: x / Non Sq Epi: Occasional / Bacteria: Occasional    MEDICATIONS  (STANDING):  dextrose 5% + sodium chloride 0.9%. 1000 milliLiter(s) (83 mL/Hr) IV Continuous <Continuous>  enoxaparin Injectable 40 milliGRAM(s) SubCutaneous daily  folic acid 1 milliGRAM(s) Oral daily  lactulose Syrup 10 Gram(s) Oral three times a day  LORazepam   Injectable 1 milliGRAM(s) IV Push every 4 hours  LORazepam   Injectable   IV Push   multivitamin 1 Tablet(s) Oral daily  pantoprazole  Injectable 40 milliGRAM(s) IV Push daily  rifAXIMin 550 milliGRAM(s) Oral two times a day  thiamine 100 milliGRAM(s) Oral daily    MEDICATIONS  (PRN):  LORazepam   Injectable 2 milliGRAM(s) IV Push every 2 hours PRN CIWA-Ar score increase by 2 points and a total score of 7 or less  LORazepam   Injectable 2 milliGRAM(s) IV Push every 1 hour PRN Symptom-triggered: each CIWA -Ar score 8 or GREATER    RADIOLOGY & ADDITIONAL TESTS: personally visualized    PHYSICAL EXAM:    General: alert male  in no acute distress  Eyes: conjunctiva and sclera clear  Head: Normocephalic; atraumatic  ENMT: No nasal discharge; airway clear  Neck: Supple; non tender; no masses  Respiratory: No wheezes, rales or rhonchi  Cardiovascular: S1, S2 reg, tachycardic  Gastrointestinal: Soft non-tender non-distended; Normal bowel sounds  Genitourinary: No costovertebral angle tenderness  Extremities: No clubbing, cyanosis or edema  Vascular: Peripheral pulses palpable 2+ bilaterally  Neurological: no deficits  Skin: Warm and dry.   Musculoskeletal: Normal tone, without deformities  
CC: alcohol wd; AMS, ADRIANE (14 Sep 2021 17:57)    HPI:  35/M with PMHx of seizure disorder brought to the ED BIBEMS. EMS was called by police when pt was found on the side of the road "digging in dirt." Pt with known Hx of ETOH abuse, admits to drinking 3-5 bottles a day. It could not be ascertained as to bottles of what, whether beer or hard liquor. Pt is confused at this time and trying to remove tele monitor.     CT head neg. (14 Sep 2021 16:26)    INTERVAL HPI/OVERNIGHT EVENTS: no complaints, does not require PRN, tachycardic with exertion, distended abdomen  Other ROS reviewed and neg     Vital Signs Last 24 Hrs  T(C): 37.1 (17 Sep 2021 08:20), Max: 38.1 (16 Sep 2021 23:42)  T(F): 98.7 (17 Sep 2021 08:20), Max: 100.6 (16 Sep 2021 23:42)  HR: 84 (17 Sep 2021 12:00) (72 - 110)  BP: 122/75 (17 Sep 2021 12:00) (96/72 - 135/89)  BP(mean): 85 (17 Sep 2021 12:00) (62 - 111)  RR: 16 (17 Sep 2021 07:00) (13 - 26)  SpO2: 96% (17 Sep 2021 12:00) (95% - 98%)  I&O's Detail    16 Sep 2021 07:01  -  17 Sep 2021 07:00  --------------------------------------------------------  IN:    dextrose 5% + sodium chloride 0.9%: 1996 mL    Oral Fluid: 702 mL  Total IN: 2698 mL    OUT:    Voided (mL): 500 mL  Total OUT: 500 mL    Total NET: 2198 mL      17 Sep 2021 07:01  -  17 Sep 2021 12:32  --------------------------------------------------------  IN:  Total IN: 0 mL    OUT:    Voided (mL): 500 mL  Total OUT: 500 mL    Total NET: -500 mL                        9.3    7.30  )-----------( 229      ( 17 Sep 2021 07:48 )             30.6     17 Sep 2021 07:48    141    |  109    |  1      ----------------------------<  350    2.6     |  24     |  0.50     Ca    7.9        17 Sep 2021 07:48  Phos  3.4       17 Sep 2021 07:48  Mg     1.8       17 Sep 2021 07:48    MEDICATIONS  (STANDING):  enoxaparin Injectable 40 milliGRAM(s) SubCutaneous daily  folic acid 1 milliGRAM(s) Oral daily  lactulose Syrup 10 Gram(s) Oral daily  LORazepam     Tablet 0.5 milliGRAM(s) Oral every 8 hours  multivitamin 1 Tablet(s) Oral daily  pantoprazole  Injectable 40 milliGRAM(s) IV Push daily  rifAXIMin 550 milliGRAM(s) Oral two times a day    MEDICATIONS  (PRN):  LORazepam   Injectable 2 milliGRAM(s) IV Push every 2 hours PRN CIWA-Ar score increase by 2 points and a total score of 7 or less  LORazepam   Injectable 2 milliGRAM(s) IV Push every 1 hour PRN Symptom-triggered: each CIWA -Ar score 8 or GREATER    RADIOLOGY & ADDITIONAL TESTS: personally visualized    PHYSICAL EXAM:    General: alert male  in no acute distress  Eyes: conjunctiva and sclera clear  Head: Normocephalic; atraumatic  ENMT: No nasal discharge; airway clear  Neck: Supple; non tender; no masses  Respiratory: No wheezes, rales or rhonchi  Cardiovascular: S1, S2 reg, tachycardic  Gastrointestinal: Soft distended abdomen, NT, + bowel sounds  Genitourinary: No costovertebral angle tenderness  Extremities: No clubbing, cyanosis or edema  Vascular: Peripheral pulses palpable 2+ bilaterally  Neurological: no deficits  Skin: Warm and dry.   Musculoskeletal: Normal tone, without deformities  
asked to see patient by Dr. Castro for Alcohol withdrawal    HPI:     HX obtained from chart. 35/M with PMHx of seizure disorder brought to the ED BIBEMS. EMS was called by police when pt was found on the side of the road "digging in dirt." Pt with known Hx of ETOH abuse, admited to drinking 3-5 bottles a day. It could not be ascertained as to bottles of what, whether beer or hard liquor. Pt is confused at this time and trying to remove tele monitor.   ON dt protocol  Had increased ammonia level started on rifaxin    CT head neg.    ROS cant obtain     MEDICATIONS  (STANDING):  dextrose 5% + sodium chloride 0.9%. 1000 milliLiter(s) (83 mL/Hr) IV Continuous <Continuous>  enoxaparin Injectable 40 milliGRAM(s) SubCutaneous daily  folic acid 1 milliGRAM(s) Oral daily  lactulose Syrup 10 Gram(s) Oral three times a day  LORazepam   Injectable 2 milliGRAM(s) IV Push every 4 hours  LORazepam   Injectable   IV Push   multivitamin 1 Tablet(s) Oral daily  pantoprazole  Injectable 40 milliGRAM(s) IV Push daily  potassium phosphate IVPB 15 milliMole(s) IV Intermittent once  rifAXIMin 550 milliGRAM(s) Oral two times a day  thiamine 100 milliGRAM(s) Oral daily    MEDICATIONS  (PRN):  LORazepam   Injectable 2 milliGRAM(s) IV Push every 2 hours PRN CIWA-Ar score increase by 2 points and a total score of 7 or less  LORazepam   Injectable 2 milliGRAM(s) IV Push every 1 hour PRN Symptom-triggered: each CIWA -Ar score 8 or GREATER      Height (cm): 157.5 ( @ 12:11)  Weight (kg): 63.5 ( @ 12:11)  BMI (kg/m2): 25.6 ( @ 12:11)    ICU Vital Signs Last 24 Hrs  T(C): 36.9 (14 Sep 2021 12:11), Max: 36.9 (14 Sep 2021 12:11)  T(F): 98.4 (14 Sep 2021 12:11), Max: 98.4 (14 Sep 2021 12:11)  HR: 128 (14 Sep 2021 17:25) (105 - 128)  BP: 126/88 (14 Sep 2021 17:25) (115/83 - 135/89)  BP(mean): 100 (14 Sep 2021 14:55) (100 - 100)  ABP: --  ABP(mean): --  RR: 20 (14 Sep 2021 14:40) (18 - 20)  SpO2: 100% (14 Sep 2021 14:55) (97% - 100%)    Physical exam    General slightly tremulous  HEENT anicteric  lungs clear  cv rrr  abdomen soft, non tender  extremities warm  neuro - confused, tremulous. slightly agitated    I&O's Summary                          11.7   14.28 )-----------( 188      ( 14 Sep 2021 12:29 )             37.6       09-14    135  |  100  |  21  ----------------------------<  135<H>  2.7<LL>   |  25  |  1.31<H>    Ca    9.7      14 Sep 2021 12:29  Phos  2.4     -  Mg     2.2     09-14    TPro  9.7<H>  /  Alb  4.6  /  TBili  2.0<H>  /  DBili  x   /  AST  206<H>  /  ALT  84<H>  /  AlkPhos  139<H>  09-14      CARDIAC MARKERS ( 14 Sep 2021 16:55 )  <0.015 ng/mL / x     / x     / x     / x        Urinalysis Basic - ( 14 Sep 2021 15:57 )    Color: Pastora / Appearance: Clear / S.020 / pH: x  Gluc: x / Ketone: Small  / Bili: Small / Urobili: 4 mg/dL   Blood: x / Protein: 100 mg/dL / Nitrite: Negative   Leuk Esterase: Trace / RBC: 3-5 /HPF / WBC 3-5   Sq Epi: x / Non Sq Epi: Occasional / Bacteria: Occasional                                                                                       
CC: alcohol wd; AMS, ADRIANE     HPI:  35/M with PMHx of seizure disorder brought to the ED BIBEMS. EMS was called by police when pt was found on the side of the road "digging in dirt." Pt with known Hx of ETOH abuse, admits to drinking 3-5 bottles a day. It could not be ascertained as to bottles of what, whether beer or hard liquor.  He was admitted to stepdown etoh withdrawal/hepatic encephalopathy. treated with ativan/lactulose/XIFAXAN.   now transferred to .     pt seen and exmained this am. feels well. no complaints. tolerating po. denies n/v/d. NO f/c/r.     9/20/21- doing good today. Off ciwa    ROS: all 10 systems reviewed and is as above otherwise negative.     Vital Signs Last 24 Hrs  T(C): 37.3 (20 Sep 2021 08:48), Max: 37.4 (19 Sep 2021 20:25)  T(F): 99.2 (20 Sep 2021 08:48), Max: 99.3 (19 Sep 2021 20:25)  HR: 69 (20 Sep 2021 08:48) (65 - 75)  BP: 134/89 (20 Sep 2021 08:48) (96/76 - 134/89)  BP(mean): --  RR: 16 (20 Sep 2021 08:48) (16 - 16)  SpO2: 100% (20 Sep 2021 08:48) (99% - 100%)    PHYSICAL EXAM:  GENERAL: Comfortable, no acute distress   HEAD:  Normocephalic, atraumatic  EYES: EOMI, PERRLA  HEENT: Moist mucous membranes  NECK: Supple, No JVD  NERVOUS SYSTEM:  non focal  CHEST/LUNG: Clear to auscultation bilaterally  HEART: Regular rate and rhythm  ABDOMEN: Soft,non tender, distended, Bowel sounds present  GENITOURINARY: Voiding, no palpable bladder  EXTREMITIES:   No clubbing, cyanosis, or edema  MUSCULOSKELETAL- No muscle tenderness, no joint tenderness  SKIN-no rash    LABS:                        10.7   8.75  )-----------( 412      ( 20 Sep 2021 07:11 )             34.6     20 Sep 2021 07:11    135    |  103    |  9      ----------------------------<  98     4.3     |  30     |  0.51     Ca    9.0        20 Sep 2021 07:11    TPro  8.0    /  Alb  3.5    /  TBili  0.4    /  DBili  x      /  AST  95     /  ALT  102    /  AlkPhos  131    20 Sep 2021 07:11    LIVER FUNCTIONS - ( 20 Sep 2021 07:11 )  Alb: 3.5 g/dL / Pro: 8.0 gm/dL / ALK PHOS: 131 U/L / ALT: 102 U/L / AST: 95 U/L / GGT: x           US Abdomen Complete (US Abdomen Complete .) (09.15.21 @ 09:58) >  Fatty liver without morphologic evidence of cirrhosis.  Mild left hydronephrosis, etiology not seen.        ASSESSMENT AND PLAN:    1. AMS - metabolic/hepatic encephalopathy due to ETOH withdrawal + ETOH liver Dx - resolved   - ativan po, lactulose, xifaxan   - thiamine for presumed thiamine deficiency    2. Hypokalemia   - repleted    3. Left hydro outpt eval/ [facility]    4. Abn TSH   - outpatient f/u    5. VTE proph - LMWH    6. GI proph - PPI    #Anemia- HH stable. Outpatient work up    7. Dispo- home today. DC time 45 mins.   
CC: alcohol wd; AMS, ADRIANE (14 Sep 2021 17:57)    HPI:  35/M with PMHx of seizure disorder brought to the ED BIBEMS. EMS was called by police when pt was found on the side of the road "digging in dirt." Pt with known Hx of ETOH abuse, admits to drinking 3-5 bottles a day. It could not be ascertained as to bottles of what, whether beer or hard liquor. Pt is confused at this time and trying to remove tele monitor.     Vital Signs Last 24 Hrs  T(C): 37.7 (18 Sep 2021 08:46), Max: 37.7 (18 Sep 2021 08:46)  T(F): 99.8 (18 Sep 2021 08:46), Max: 99.8 (18 Sep 2021 08:46)  HR: 78 (18 Sep 2021 04:41) (78 - 84)  BP: 117/83 (18 Sep 2021 04:41) (106/65 - 144/93)  BP(mean): 103 (17 Sep 2021 21:58) (75 - 103)  RR: 22 (17 Sep 2021 16:09) (22 - 22)  SpO2: 98% (18 Sep 2021 04:41) (96% - 100%)      General: alert male  in no acute distress  Eyes: conjunctiva and sclera clear  Head: Normocephalic; atraumatic  ENMT: No nasal discharge; airway clear  Neck: Supple; non tender; no masses  Respiratory: No wheezes, rales or rhonchi  Cardiovascular: S1, S2 reg, tachycardic  Gastrointestinal: Soft distended abdomen, NT, + bowel sounds  Genitourinary: No costovertebral angle tenderness  Extremities: No clubbing, cyanosis or edema  Vascular: Peripheral pulses palpable 2+ bilaterally  Neurological: no deficits  Skin: Warm and dry.   Musculoskeletal: Normal tone, without deformities                            9.8    8.09  )-----------( 273      ( 18 Sep 2021 05:31 )             31.5   09-18    137  |  104  |  3<L>  ----------------------------<  97  2.9<LL>   |  29  |  0.49<L>    Ca    8.6      18 Sep 2021 05:31  Phos  3.4     09-17  Mg     2.2     09-18    TPro  7.3  /  Alb  3.2<L>  /  TBili  0.6  /  DBili  x   /  AST  84<H>  /  ALT  74  /  AlkPhos  99  09-18    US Abdomen Complete (US Abdomen Complete .) (09.15.21 @ 09:58) >  Fatty liver without morphologic evidence of cirrhosis.  Mild left hydronephrosis, etiology not seen.        
NEPHROLOGY INTERVAL HPI/OVERNIGHT EVENTS:    Date of Service: 09-20-21 @ 14:23    9/20-- Feeling well.  and reports eating well.   phone used #[phone].  HPI:  37 yo man with PMHX of seizure dz admitted post police found [place] on street " digging in dirt"  Admitted to ICU for CIWA protocol with IV ativan and kept NPO and given Lactulose for high Ammonia level.  Noted for ADRIANE on admission with improvement with IVF.  Transferred to  for further care,  Nephrology evaluation for persistent Hypokalemia.  Even, via  phone, pt exhibits very little insight to his disease.  However, does indicate he does not eat well due to intermittent feelings of nausea, even prior to admission.   Knows place but not oriented to year.    PMHX and PSHX.  Seizure dz.    MEDICATIONS  (STANDING):  enoxaparin Injectable 40 milliGRAM(s) SubCutaneous daily  folic acid 1 milliGRAM(s) Oral daily  lactulose Syrup 10 Gram(s) Oral daily  multivitamin 1 Tablet(s) Oral daily  pantoprazole    Tablet 40 milliGRAM(s) Oral daily  rifAXIMin 550 milliGRAM(s) Oral two times a day    MEDICATIONS  (PRN):    Vital Signs Last 24 Hrs  T(C): 37.3 (20 Sep 2021 08:48), Max: 37.4 (19 Sep 2021 20:25)  T(F): 99.2 (20 Sep 2021 08:48), Max: 99.3 (19 Sep 2021 20:25)  HR: 69 (20 Sep 2021 08:48) (65 - 75)  BP: 134/89 (20 Sep 2021 08:48) (96/76 - 134/89)  BP(mean): --  RR: 16 (20 Sep 2021 08:48) (16 - 16)  SpO2: 100% (20 Sep 2021 08:48) (99% - 100%)    09-19 @ 07:01  -  09-20 @ 07:00  --------------------------------------------------------  IN: 1120 mL / OUT: 300 mL / NET: 820 mL    09-20 @ 07:01  -  09-20 @ 14:23  --------------------------------------------------------  IN: 240 mL / OUT: 150 mL / NET: 90 mL    PHYSICAL EXAM:  GENERAL: comfortable.  CHEST/LUNG: Clear to aus  HEART: S1S2 RRR  ABDOMEN: soft  EXTREMITIES: no edema  SKIN:     LABS:                        10.7   8.75  )-----------( 412      ( 20 Sep 2021 07:11 )             34.6     09-20    135  |  103  |  9   ----------------------------<  98  4.3   |  30  |  0.51    Ca    9.0      20 Sep 2021 07:11    TPro  8.0  /  Alb  3.5  /  TBili  0.4  /  DBili  x   /  AST  95<H>  /  ALT  102<H>  /  AlkPhos  131<H>  09-20                RADIOLOGY & ADDITIONAL TESTS:

## 2021-09-20 NOTE — DISCHARGE NOTE PROVIDER - HOSPITAL COURSE
Patient admitted with alcohol withdrawal, stabilized. Electrolytes repleted. Stable for discharge home

## 2021-09-23 DIAGNOSIS — Z20.822 CONTACT WITH AND (SUSPECTED) EXPOSURE TO COVID-19: ICD-10-CM

## 2021-09-23 DIAGNOSIS — K70.40 ALCOHOLIC HEPATIC FAILURE WITHOUT COMA: ICD-10-CM

## 2021-09-23 DIAGNOSIS — N13.30 UNSPECIFIED HYDRONEPHROSIS: ICD-10-CM

## 2021-09-23 DIAGNOSIS — D72.829 ELEVATED WHITE BLOOD CELL COUNT, UNSPECIFIED: ICD-10-CM

## 2021-09-23 DIAGNOSIS — N28.9 DISORDER OF KIDNEY AND URETER, UNSPECIFIED: ICD-10-CM

## 2021-09-23 DIAGNOSIS — G93.41 METABOLIC ENCEPHALOPATHY: ICD-10-CM

## 2021-09-23 DIAGNOSIS — F10.239 ALCOHOL DEPENDENCE WITH WITHDRAWAL, UNSPECIFIED: ICD-10-CM

## 2021-09-23 DIAGNOSIS — K70.10 ALCOHOLIC HEPATITIS WITHOUT ASCITES: ICD-10-CM

## 2021-09-23 DIAGNOSIS — G40.909 EPILEPSY, UNSPECIFIED, NOT INTRACTABLE, WITHOUT STATUS EPILEPTICUS: ICD-10-CM

## 2021-09-23 DIAGNOSIS — Z59.0 HOMELESSNESS: ICD-10-CM

## 2021-09-23 DIAGNOSIS — E87.6 HYPOKALEMIA: ICD-10-CM

## 2021-09-23 DIAGNOSIS — E83.39 OTHER DISORDERS OF PHOSPHORUS METABOLISM: ICD-10-CM

## 2021-09-23 SDOH — ECONOMIC STABILITY - HOUSING INSECURITY: HOMELESSNESS: Z59.0

## 2022-09-29 NOTE — ED PROVIDER NOTE - CPE EDP CARDIAC NORM
Anesthesia Evaluation     Patient summary reviewed and Nursing notes reviewed   history of anesthetic complications: PONV  NPO Solid Status: > 8 hours  NPO Liquid Status: > 8 hours           Airway   Mallampati: II  TM distance: >3 FB  Neck ROM: full  No difficulty expected  Dental - normal exam     Pulmonary - normal exam   (+) sleep apnea,   Cardiovascular - normal exam  Exercise tolerance: good (4-7 METS)    PT is on anticoagulation therapy    (+) hyperlipidemia,       Neuro/Psych- negative ROS  GI/Hepatic/Renal/Endo    (+)  GERD well controlled,  thyroid problem hypothyroidism    Musculoskeletal     Abdominal    Substance History      OB/GYN          Other   arthritis,                        Anesthesia Plan    ASA 3     MAC     (Risks and benefits discussed including risk of aspiration, recall and dental damage. All patient questions answered. Will continue with POC.)  intravenous induction     Anesthetic plan, risks, benefits, and alternatives have been provided, discussed and informed consent has been obtained with: patient.  Pre-procedure education provided     normal...
